# Patient Record
Sex: FEMALE | Race: WHITE | NOT HISPANIC OR LATINO | Employment: OTHER | URBAN - METROPOLITAN AREA
[De-identification: names, ages, dates, MRNs, and addresses within clinical notes are randomized per-mention and may not be internally consistent; named-entity substitution may affect disease eponyms.]

---

## 2023-04-13 LAB
ERYTHROCYTE DISTRIBUTION WIDTH (RATIO) BY AUTOMATED COUNT: 13.6 % (ref 11.5–14.5)
ERYTHROCYTE MEAN CORPUSCULAR HEMOGLOBIN CONCENTRATION (G/DL) BY AUTOMATED: 32.1 G/DL (ref 32–36)
ERYTHROCYTE MEAN CORPUSCULAR VOLUME (FL) BY AUTOMATED COUNT: 91 FL (ref 80–100)
ERYTHROCYTES (10*6/UL) IN BLOOD BY AUTOMATED COUNT: 4.4 X10E12/L (ref 4–5.2)
FOLLITROPIN (IU/L) IN SER/PLAS: 37.7 IU/L
HEMATOCRIT (%) IN BLOOD BY AUTOMATED COUNT: 39.9 % (ref 36–46)
HEMOGLOBIN (G/DL) IN BLOOD: 12.8 G/DL (ref 12–16)
LEUKOCYTES (10*3/UL) IN BLOOD BY AUTOMATED COUNT: 11.8 X10E9/L (ref 4.4–11.3)
LUTEINIZING HORMONE (IU/ML) IN SER/PLAS: 15.8 IU/L
NRBC (PER 100 WBCS) BY AUTOMATED COUNT: 0 /100 WBC (ref 0–0)
PLATELETS (10*3/UL) IN BLOOD AUTOMATED COUNT: 168 X10E9/L (ref 150–450)
THYROTROPIN (MIU/L) IN SER/PLAS BY DETECTION LIMIT <= 0.05 MIU/L: 2.63 MIU/L (ref 0.44–3.98)

## 2023-06-29 ENCOUNTER — HOSPITAL ENCOUNTER (OUTPATIENT)
Dept: DATA CONVERSION | Facility: HOSPITAL | Age: 48
End: 2023-06-29
Attending: OBSTETRICS & GYNECOLOGY | Admitting: OBSTETRICS & GYNECOLOGY
Payer: COMMERCIAL

## 2023-06-29 DIAGNOSIS — N84.0 POLYP OF CORPUS UTERI: ICD-10-CM

## 2023-06-29 DIAGNOSIS — N93.9 ABNORMAL UTERINE AND VAGINAL BLEEDING, UNSPECIFIED: ICD-10-CM

## 2023-06-29 DIAGNOSIS — D25.9 LEIOMYOMA OF UTERUS, UNSPECIFIED: ICD-10-CM

## 2023-06-29 LAB — HCG, URINE: NEGATIVE

## 2023-07-07 LAB
COMPLETE PATHOLOGY REPORT: NORMAL
CONVERTED CLINICAL DIAGNOSIS-HISTORY: NORMAL
CONVERTED FINAL DIAGNOSIS: NORMAL
CONVERTED FINAL REPORT PDF LINK TO COPY AND PASTE: NORMAL
CONVERTED GROSS DESCRIPTION: NORMAL

## 2023-09-30 NOTE — H&P
History of Present Illness:   Pregnant/Lactating:  ·  Are You Pregnant no   ·  Are You Currently Breastfeeding no     History Present Illness:  Reason for surgery: Heavy menstrual bleeding, fibroid  uterus   HPI:    47-year-old.  Abnormal heavy bleeding.  Negative endometrial biopsy.  Ultrasound shows small fibroid.  Plan is for endometrial ablation.    Allergies:        Allergies:  ·  No Known Allergies :     Home Medication Review:   Home Medications Reviewed: yes     Impression/Procedure:   ·  Impression and Planned Procedure: Fibroid uterus, heavy menstrual bleeding, for outpatient endometrial ablation       ERAS (Enhanced Recovery After Surgery):  ·  ERAS Patient: no       Physical Exam by System:    Constitutional: Well developed, awake/alert/oriented  x3, no distress, alert and cooperative   Eyes: PERRL, EOMI, clear sclera   ENMT: mucous membranes moist, no apparent injury,  no lesions seen   Respiratory/Thorax: Patent airways, CTAB, normal  breath sounds with good chest expansion, thorax symmetric   Cardiovascular: Regular, rate and rhythm, no murmurs,  2+ equal pulses of the extremities, normal S 1and S 2   Gastrointestinal: Nondistended, soft, non-tender,  no rebound tenderness or guarding, no masses palpable, no organomegaly, +BS, no bruits   Genitourinary: spec exam, clots removed, cervix open  with tissue at OS   Musculoskeletal: ROM intact, no joint swelling, normal  strength   Extremities: normal extremities, no cyanosis edema,  contusions or wounds, no clubbing   Neurological: alert and oriented x3, intact senses,  motor, response and reflexes, normal strength   Breast: No masses, tenderness, no discharge or discoloration   Lymphatic: No significant lymphadenopathy   Psychological: Appropriate mood and behavior   Skin: Warm and dry, no lesions, no rashes     Consent:   COVID-19 Consent:  ·  COVID-19 Risk Consent Surgeon has reviewed key risks related to the risk of josé miguel COVID-19 and if  they contract COVID-19 what the risks are.       Electronic Signatures:  Franklyn Gould)  (Signed 28-Jun-2023 15:57)   Authored: History of Present Illness, Allergies, Home  Medication Review, Impression/Procedure, ERAS, Physical Exam, Consent, Note Completion      Last Updated: 28-Jun-2023 15:57 by Franklyn Gould)

## 2023-10-02 NOTE — OP NOTE
Post Operative Note:     Post-Procedure Diagnosis: Abnormal uterine bleeding,  uterine polyp   Procedure: 1.  D&C, hysteroscopic endometrial ablation  2.   3.   4.   5.   Surgeon: Luis Fernando   Resident/Fellow/Other Assistant: No   Estimated Blood Loss (mL): Minimal   Specimen: yes. Endometrial curettings with polyp  just   Findings: Endometrial polyp     Operative Report Dictated:  Dictation: not applicable - note contains Operative  Report   Note Recipients: Franklyn Gould MD Koniarczyk, Michael P, MD - [id number] []   Operative Report:    June 29, 2023 [facility]    Preop diagnosis abnormal uterine bleeding, postop the same plus endometrial polyp    Received with D&C, hysteroscopic endometrial ablation    Surgeon myself, general anesthetic    Under general anesthetic lithotomy position patient prepped draped usual manner.  Speculum placed.  Single-tooth tenaculum used to grasp the antilipid cervix.  Internal os dilated.  Hysteroscope inserted and cavity visualized.  Polyp emanating from the  fundus.  Removed with ovum forceps.  Using a rollerball electrode and glycine as a distending medium, after sharp and suction curettage of the cavity, cavity ablated in its entirety from fundus to internal os.  Fluid differential minimal.  Tolerated procedure  well.  Taken recovery in stable condition    Attestation:   Note Completion:  Attending Attestation I performed the procedure without a resident         Electronic Signatures:  Franklyn Gould)  (Signed 29-Jun-2023 16:09)   Authored: Post Operative Note, Note Completion      Last Updated: 29-Jun-2023 16:09 by Franklyn Gould)

## 2023-12-19 ENCOUNTER — OFFICE VISIT (OUTPATIENT)
Dept: OBSTETRICS AND GYNECOLOGY | Facility: CLINIC | Age: 48
End: 2023-12-19
Payer: COMMERCIAL

## 2023-12-19 VITALS
DIASTOLIC BLOOD PRESSURE: 78 MMHG | BODY MASS INDEX: 26.84 KG/M2 | HEIGHT: 66 IN | WEIGHT: 167 LBS | SYSTOLIC BLOOD PRESSURE: 118 MMHG

## 2023-12-19 DIAGNOSIS — L73.2 HIDRADENITIS SUPPURATIVA: Primary | ICD-10-CM

## 2023-12-19 PROCEDURE — 99214 OFFICE O/P EST MOD 30 MIN: CPT | Performed by: OBSTETRICS & GYNECOLOGY

## 2023-12-19 RX ORDER — CEPHALEXIN 500 MG/1
500 CAPSULE ORAL EVERY 8 HOURS SCHEDULED
Status: SHIPPED | OUTPATIENT
Start: 2023-12-19 | End: 2023-12-26

## 2023-12-19 RX ORDER — LEVOTHYROXINE SODIUM 75 UG/1
75 TABLET ORAL
COMMUNITY

## 2023-12-19 RX ORDER — LORAZEPAM 0.5 MG/1
0.5 TABLET ORAL EVERY 6 HOURS PRN
COMMUNITY

## 2023-12-19 NOTE — PROGRESS NOTES
Subjective   Patient ID: Sammi Miranda is a 48 y.o. female who presents for vulvar lesion.  Established patient.  She is doing great since her endometrial ablation earlier this year but she has an issue with a recurring infection in the left inner groin.  Occasionally it increases in size becomes painful drains and she states it has been ongoing for years.    On exam there is a firmness in the left inguinal area superficial.  Consistent with hidradenitis suppurativa.  We reviewed the nature of this condition chronic in nature recurring nature different treatment options.  She is at a point where she would like to consider excision.    This point I do need to treated as an infection so we will prescribe Keflex 500 mg 3 times a day for 7 days.  Follow-up with me in january.  If there is still a palpable lesion we will consider excision        Review of Systems   All other systems reviewed and are negative.      Objective   Physical Exam  Genitourinary:         Comments: Palpable firm tender area left inguinal region        Assessment/Plan   Chronic recurrent infection left inguinal area.  Hidradenitis suppurativa.  Inflamed today.  Call in Keflex 500 3 times daily 7 days.  Follow-up next month to consider excision         Franklyn Gould MD 12/19/23 2:41 PM

## 2023-12-22 ENCOUNTER — PATIENT OUTREACH (OUTPATIENT)
Dept: OBSTETRICS AND GYNECOLOGY | Facility: HOSPITAL | Age: 48
End: 2023-12-22
Payer: COMMERCIAL

## 2023-12-22 ENCOUNTER — TELEPHONE (OUTPATIENT)
Dept: OBSTETRICS AND GYNECOLOGY | Facility: CLINIC | Age: 48
End: 2023-12-22
Payer: COMMERCIAL

## 2023-12-22 DIAGNOSIS — L03.818 CELLULITIS OF OTHER SPECIFIED SITE: ICD-10-CM

## 2023-12-22 DIAGNOSIS — N76.2 ACUTE VULVITIS: Primary | ICD-10-CM

## 2023-12-22 RX ORDER — CEPHALEXIN 500 MG/1
500 CAPSULE ORAL EVERY 8 HOURS SCHEDULED
Status: SHIPPED | OUTPATIENT
Start: 2023-12-22 | End: 2023-12-29

## 2024-01-23 ENCOUNTER — OFFICE VISIT (OUTPATIENT)
Dept: OBSTETRICS AND GYNECOLOGY | Facility: CLINIC | Age: 49
End: 2024-01-23
Payer: COMMERCIAL

## 2024-01-23 ENCOUNTER — PREP FOR PROCEDURE (OUTPATIENT)
Dept: OBSTETRICS AND GYNECOLOGY | Facility: HOSPITAL | Age: 49
End: 2024-01-23

## 2024-01-23 VITALS
SYSTOLIC BLOOD PRESSURE: 112 MMHG | BODY MASS INDEX: 26.84 KG/M2 | WEIGHT: 167 LBS | DIASTOLIC BLOOD PRESSURE: 72 MMHG | HEIGHT: 66 IN

## 2024-01-23 DIAGNOSIS — L73.2 HIDRADENITIS SUPPURATIVA: ICD-10-CM

## 2024-01-23 DIAGNOSIS — N90.89 VULVAR LESION: Primary | ICD-10-CM

## 2024-01-23 DIAGNOSIS — L73.2 VULVAL HIDRADENITIS SUPPURATIVA: Primary | ICD-10-CM

## 2024-01-23 PROCEDURE — 99214 OFFICE O/P EST MOD 30 MIN: CPT | Performed by: OBSTETRICS & GYNECOLOGY

## 2024-01-23 RX ORDER — CELECOXIB 50 MG/1
400 CAPSULE ORAL ONCE
Status: CANCELLED | OUTPATIENT
Start: 2024-01-23 | End: 2024-01-23

## 2024-01-23 RX ORDER — GABAPENTIN 600 MG/1
600 TABLET ORAL ONCE
Status: CANCELLED | OUTPATIENT
Start: 2024-01-23 | End: 2024-01-23

## 2024-01-23 RX ORDER — ACETAMINOPHEN 325 MG/1
975 TABLET ORAL ONCE
Status: CANCELLED | OUTPATIENT
Start: 2024-01-23 | End: 2024-01-23

## 2024-01-23 NOTE — PROGRESS NOTES
Subjective   Patient ID: Sammi Miranda is a 48 y.o. female who presents for Follow up medication.  My last note:   Franklyn Gould MD  Physician  Obstetrics     Progress Notes      Signed     Encounter Date: 12/19/2023    Signed     Expand All Collapse All       Subjective   Patient ID: Sammi Miranda is a 48 y.o. female who presents for vulvar lesion.  Established patient.  She is doing great since her endometrial ablation earlier this year but she has an issue with a recurring infection in the left inner groin.  Occasionally it increases in size becomes painful drains and she states it has been ongoing for years.     On exam there is a firmness in the left inguinal area superficial.  Consistent with hidradenitis suppurativa.  We reviewed the nature of this condition chronic in nature recurring nature different treatment options.  She is at a point where she would like to consider excision.     This point I do need to treated as an infection so we will prescribe Keflex 500 mg 3 times a day for 7 days.  Follow-up with me in january.  If there is still a palpable lesion we will consider excision           Review of Systems   All other systems reviewed and are negative.              Objective   Physical Exam  Genitourinary:          Comments: Palpable firm tender area left inguinal region                 Assessment/Plan   Chronic recurrent infection left inguinal area.  Hidradenitis suppurativa.  Inflamed today.  Call in Keflex 500 3 times daily 7 days.  Follow-up next month to consider excision        Franklyn Goudl MD 12/19/23 2:41 PM       Has completed her Keflex.  Comes in today to review options she would really like this area excised.  She is been dealing with it for years.  We reviewed the nature of hidradenitis suppurativa that it is a recurrent infection of the glans.  There is no known treatment that is better than others.  Wide local excision is an option.  Its mainly in her left inguinal area.  Would recommend  marking this preoperatively.  We discussed that it is a surgery surgery has risks of infection bleeding injury to adjacent organs and there is no guarantee that I will prevent a recurrence    Impression is recurrent left inguinal area of infection consistent with hidradenitis suppurativa.  For wide local excision    No medical allergies.  Outpatient surgery.  6 4 to 6 weeks of sutures before they resolve          Review of Systems    Objective   Physical Exam  Constitutional:       Appearance: Normal appearance.   Genitourinary:         Comments: Wali area preoperatively  Neurological:      Mental Status: She is alert.         Assessment/Plan   Provide local excision of vulvar area of hidradenitis suppurativa.  Marked the area preoperatively     Exam today shows that it has healed up nicely after Keflex.  Much less inflamed than when we saw her last month    Franklyn Gould MD 01/23/24 11:54 AM

## 2024-03-19 ENCOUNTER — TELEPHONE (OUTPATIENT)
Dept: PREADMISSION TESTING | Facility: HOSPITAL | Age: 49
End: 2024-03-19
Payer: COMMERCIAL

## 2024-03-19 NOTE — TELEPHONE ENCOUNTER
Pre-procedure PAT phone assessment completed. Pre-operative and medication instructions reviewed with patient. Patient verbalizes understanding of instructions.  SURGERY PRE-OPERATIVE INSTRUCTIONS    *You will receive a phone call the day before your procedure  after 2pm, (or the Friday before your surgery if scheduled on a Monday.) Generally the hospital will be calling you with this information after that time.    *You are not to eat after midnight the night before the surgery. You may have 8oz of a clear liquid up until 2 hours prior to arriving to the hospital. The exception is with medications you were instructed to take day of surgery.    *You may take tylenol for pain/discomfort as needed.     *Stop taking all aspirin products, ibuprofen (motrin/advil), naproxen (aleve/naprosyn) for one week prior to surgery.    *Stop taking all vitamins and supplements one week prior to surgery.     *You should not have alcoholic beverages for 24 hours before surgery.     *You should not smoke 24 hours prior to surgery.     *To help prevent surgical infections bathe/shower with Dial soap the evening before surgery.    *You can wear deodorant but no lotion, powder, or perfume/cologne. You should remove all make-up and nail polish at home.    *If you wear glasses, please bring a case for the glasses with you.    *You will be asked to remove dentures and contacts.     *Please leave all valuables at home.    *You should wear loose, comfortable clothing that will accommodate bandages and/or casts.    *You should notify your doctor of any change in your condition (fever, cold, rash, etc). Surgery may need to be re-scheduled until a time you are in better health.    *A responsible adult is required to accompany you to and from the hospital if you are receiving anesthesia or a sedative. Patients are not permitted to drive for 24 hours after anesthesia.     *You can use the [employer] parking if you wish.     *If you have any further  questions please call [facility] [phone].

## 2024-03-20 ENCOUNTER — ANESTHESIA EVENT (OUTPATIENT)
Dept: OPERATING ROOM | Facility: HOSPITAL | Age: 49
End: 2024-03-20
Payer: COMMERCIAL

## 2024-03-20 RX ORDER — DROPERIDOL 2.5 MG/ML
0.62 INJECTION, SOLUTION INTRAMUSCULAR; INTRAVENOUS ONCE AS NEEDED
Status: CANCELLED | OUTPATIENT
Start: 2024-03-20

## 2024-03-20 RX ORDER — SODIUM CHLORIDE, SODIUM LACTATE, POTASSIUM CHLORIDE, CALCIUM CHLORIDE 600; 310; 30; 20 MG/100ML; MG/100ML; MG/100ML; MG/100ML
100 INJECTION, SOLUTION INTRAVENOUS CONTINUOUS
Status: CANCELLED | OUTPATIENT
Start: 2024-03-20

## 2024-03-20 RX ORDER — OXYCODONE HYDROCHLORIDE 5 MG/1
5 TABLET ORAL EVERY 4 HOURS PRN
Status: CANCELLED | OUTPATIENT
Start: 2024-03-20

## 2024-03-20 RX ORDER — ONDANSETRON HYDROCHLORIDE 2 MG/ML
4 INJECTION, SOLUTION INTRAVENOUS ONCE AS NEEDED
Status: CANCELLED | OUTPATIENT
Start: 2024-03-20

## 2024-03-21 ENCOUNTER — ANESTHESIA (OUTPATIENT)
Dept: OPERATING ROOM | Facility: HOSPITAL | Age: 49
End: 2024-03-21
Payer: COMMERCIAL

## 2024-03-21 ENCOUNTER — HOSPITAL ENCOUNTER (OUTPATIENT)
Facility: HOSPITAL | Age: 49
Setting detail: OUTPATIENT SURGERY
Discharge: HOME | End: 2024-03-21
Attending: OBSTETRICS & GYNECOLOGY | Admitting: OBSTETRICS & GYNECOLOGY
Payer: COMMERCIAL

## 2024-03-21 VITALS
RESPIRATION RATE: 18 BRPM | HEIGHT: 66 IN | BODY MASS INDEX: 26.57 KG/M2 | TEMPERATURE: 97.5 F | HEART RATE: 72 BPM | DIASTOLIC BLOOD PRESSURE: 97 MMHG | OXYGEN SATURATION: 100 % | SYSTOLIC BLOOD PRESSURE: 129 MMHG | WEIGHT: 165.34 LBS

## 2024-03-21 PROBLEM — E78.2 MIXED HYPERLIPIDEMIA: Status: ACTIVE | Noted: 2024-03-21

## 2024-03-21 PROBLEM — F41.9 ANXIETY DISORDER: Status: ACTIVE | Noted: 2018-10-01

## 2024-03-21 PROBLEM — I45.10 INCOMPLETE RIGHT BUNDLE BRANCH BLOCK: Status: ACTIVE | Noted: 2024-03-21

## 2024-03-21 PROBLEM — E03.8 HYPOTHYROIDISM DUE TO HASHIMOTO'S THYROIDITIS: Status: ACTIVE | Noted: 2018-03-19

## 2024-03-21 PROBLEM — E06.3 HYPOTHYROIDISM DUE TO HASHIMOTO'S THYROIDITIS: Status: ACTIVE | Noted: 2018-03-19

## 2024-03-21 PROBLEM — E03.9 HYPOTHYROIDISM: Status: ACTIVE | Noted: 2024-03-21

## 2024-03-21 LAB — PREGNANCY TEST URINE, POC: NEGATIVE

## 2024-03-21 PROCEDURE — 81025 URINE PREGNANCY TEST: CPT | Performed by: ANESTHESIOLOGY

## 2024-03-21 RX ORDER — GABAPENTIN 300 MG/1
600 CAPSULE ORAL ONCE
Status: DISCONTINUED | OUTPATIENT
Start: 2024-03-21 | End: 2024-03-22 | Stop reason: HOSPADM

## 2024-03-21 RX ORDER — CELECOXIB 400 MG/1
400 CAPSULE ORAL ONCE
Status: DISCONTINUED | OUTPATIENT
Start: 2024-03-21 | End: 2024-03-22 | Stop reason: HOSPADM

## 2024-03-21 RX ORDER — SODIUM CHLORIDE, SODIUM LACTATE, POTASSIUM CHLORIDE, CALCIUM CHLORIDE 600; 310; 30; 20 MG/100ML; MG/100ML; MG/100ML; MG/100ML
100 INJECTION, SOLUTION INTRAVENOUS CONTINUOUS
Status: DISCONTINUED | OUTPATIENT
Start: 2024-03-21 | End: 2024-03-22 | Stop reason: HOSPADM

## 2024-03-21 RX ORDER — ACETAMINOPHEN 325 MG/1
975 TABLET ORAL ONCE
Status: DISCONTINUED | OUTPATIENT
Start: 2024-03-21 | End: 2024-03-22 | Stop reason: HOSPADM

## 2024-03-21 SDOH — HEALTH STABILITY: MENTAL HEALTH: CURRENT SMOKER: 1

## 2024-03-21 NOTE — H&P
History Of Present Illness  sammi Miranda is a 48 y.o. female presenting with    Franklyn Gould MD  Physician  Obstetrics     Progress Notes     Signed     Encounter Date: 1/23/2024     Signed       Expand All Collapse All       Subjective   Patient ID: Sammi Miranda is a 48 y.o. female who presents for Follow up medication.  My last note:   Franklyn Gould MD  Physician  Obstetrics     Progress Notes      Signed     Encounter Date: 12/19/2023     Signed      Expand All Collapse All        Subjective   Patient ID: Sammi Miranda is a 48 y.o. female who presents for vulvar lesion.  Established patient.  She is doing great since her endometrial ablation earlier this year but she has an issue with a recurring infection in the left inner groin.  Occasionally it increases in size becomes painful drains and she states it has been ongoing for years.     On exam there is a firmness in the left inguinal area superficial.  Consistent with hidradenitis suppurativa.  We reviewed the nature of this condition chronic in nature recurring nature different treatment options.  She is at a point where she would like to consider excision.     This point I do need to treated as an infection so we will prescribe Keflex 500 mg 3 times a day for 7 days.  Follow-up with me in january.  If there is still a palpable lesion we will consider excision           Review of Systems   All other systems reviewed and are negative.              Objective   Physical Exam  Genitourinary:           Comments: Palpable firm tender area left inguinal region                 Assessment/Plan   Chronic recurrent infection left inguinal area.  Hidradenitis suppurativa.  Inflamed today.  Call in Keflex 500 3 times daily 7 days.  Follow-up next month to consider excision        Franklyn Gould MD 12/19/23 2:41 PM         Has completed her Keflex.  Comes in today to review options she would really like this area excised.  She is been dealing with it for years.  We reviewed the  nature of hidradenitis suppurativa that it is a recurrent infection of the glans.  There is no known treatment that is better than others.  Wide local excision is an option.  Its mainly in her left inguinal area.  Would recommend marking this preoperatively.  We discussed that it is a surgery surgery has risks of infection bleeding injury to adjacent organs and there is no guarantee that I will prevent a recurrence     Impression is recurrent left inguinal area of infection consistent with hidradenitis suppurativa.  For wide local excision     No medical allergies.  Outpatient surgery.  6 4 to 6 weeks of sutures before they resolve              Review of Systems           Objective   Physical Exam  Constitutional:       Appearance: Normal appearance.   Genitourinary:          Comments: Wali area preoperatively  Neurological:      Mental Status: She is alert.                  Assessment/Plan   Provide local excision of vulvar area of hidradenitis suppurativa.  Marked the area preoperatively      Exam today shows that it has healed up nicely after Keflex.  Much less inflamed than when we saw her last month     Franklyn Gould MD 24 11:54 AM             .     Past Medical History  Past Medical History:   Diagnosis Date    Abnormal uterine bleeding (AUB)     Anxiety     Hidradenitis suppurativa     Hyperlipidemia     Hypothyroidism     Hypothyroidism due to Hashimoto's thyroiditis    Vulvar lesion     Vulval hidradenitis suppurativa       Surgical History  Past Surgical History:   Procedure Laterality Date     SECTION, CLASSIC      DENTAL IMPLANT      HYSTEROSCOPY      D&C, endometrial ablation        Social History  She reports that she has been smoking cigarettes. She has a 7.50 pack-year smoking history. She has never used smokeless tobacco. She reports current alcohol use. She reports current drug use. Drug: Marijuana.    Family History  No family history on file.     Allergies  Patient has no known  allergies.    Review of Systems     Physical Exam     Last Recorded Vitals  There were no vitals taken for this visit.    Relevant Results             Assessment/Plan   Principal Problem:    Vulval hidradenitis suppurativa             I spent 10 minutes in the professional and overall care of this patient.      Franklyn Gould MD

## 2024-03-21 NOTE — ANESTHESIA PREPROCEDURE EVALUATION
Patient: Sammi MARRERO Fee    Procedure Information       Date/Time: 03/21/24 1035    Procedure: EXCISION LESION FEMALE GENITALIA (Left)    Location: [facility] / [facility]    Surgeons: Franklyn Gould MD            Relevant Problems   Anesthesia (within normal limits)   (-) Malignant hyperthermia      Cardiovascular (within normal limits)      Endocrine   (+) Hypothyroidism      GI (within normal limits)      /Renal (within normal limits)      Neuro/Psych (within normal limits)      Pulmonary (within normal limits)      GI/Hepatic (within normal limits)      Hematology (within normal limits)      Musculoskeletal (within normal limits)      Eyes, Ears, Nose, and Throat (within normal limits)      Infectious Disease   (+) Vulval hidradenitis suppurativa       Clinical information reviewed:   Tobacco  Allergies  Meds   Med Hx  Surg Hx   Fam Hx          NPO Detail:  No data recorded     PHYSICAL EXAM    Anesthesia Plan    History of general anesthesia?: yes  History of complications of general anesthesia?: no    ASA 2     general     The patient is a current smoker.    intravenous induction   Postoperative administration of opioids is intended.  Trial extubation is planned.  Anesthetic plan and risks discussed with patient.    Plan discussed with CRNA.

## 2024-06-11 ENCOUNTER — APPOINTMENT (OUTPATIENT)
Dept: OBSTETRICS AND GYNECOLOGY | Facility: CLINIC | Age: 49
End: 2024-06-11
Payer: COMMERCIAL

## 2024-06-25 ENCOUNTER — OFFICE VISIT (OUTPATIENT)
Dept: OBSTETRICS AND GYNECOLOGY | Facility: CLINIC | Age: 49
End: 2024-06-25
Payer: COMMERCIAL

## 2024-06-25 VITALS
BODY MASS INDEX: 26.36 KG/M2 | DIASTOLIC BLOOD PRESSURE: 68 MMHG | HEIGHT: 66 IN | WEIGHT: 164 LBS | SYSTOLIC BLOOD PRESSURE: 112 MMHG

## 2024-06-25 DIAGNOSIS — R10.2 PAIN IN FEMALE PELVIS: Primary | ICD-10-CM

## 2024-06-25 PROCEDURE — 99214 OFFICE O/P EST MOD 30 MIN: CPT | Performed by: OBSTETRICS & GYNECOLOGY

## 2024-06-25 NOTE — PROGRESS NOTES
Subjective   Patient ID: Sammi Miranda is a 48 y.o. female who presents for Pelvic Pain.  Established patient 48 years old.  Endometrial ablation 2023 no menses since then    Surgeries include , tubal ligation, appendectomy    Since mid May she has been having right-sided pain in the area of her ovary.  Initially made her double over when she had the pain has been off and on since and then again recently at work she was doubled over rating the pain a 10 out of 10 and rates it 3 out of 10    No fever chills nausea vomiting diarrhea no other associated symptoms.    Will start with a pelvic ultrasound and then have her follow-up.  Depending on findings we may need to consider laparoscopy or consultation    Pelvic Pain  The patient's primary symptoms include pelvic pain.       Review of Systems   Genitourinary:  Positive for pelvic pain.       Objective   Physical Exam  Constitutional:       Appearance: Normal appearance. She is normal weight.   Neurological:      Mental Status: She is alert.         Assessment/Plan   Right lower quadrant pain off and on since mid May.  Occasionally 10 out of 10.  Start with pelvic ultrasound.  Follow-up to review results and recommendations         Franklyn Gould MD 24 9:48 AM

## 2024-06-28 ENCOUNTER — HOSPITAL ENCOUNTER (OUTPATIENT)
Dept: RADIOLOGY | Facility: CLINIC | Age: 49
Discharge: HOME | End: 2024-06-28
Payer: COMMERCIAL

## 2024-06-28 DIAGNOSIS — R10.2 PAIN IN FEMALE PELVIS: ICD-10-CM

## 2024-06-28 PROCEDURE — 76856 US EXAM PELVIC COMPLETE: CPT

## 2024-07-09 ENCOUNTER — APPOINTMENT (OUTPATIENT)
Dept: OBSTETRICS AND GYNECOLOGY | Facility: CLINIC | Age: 49
End: 2024-07-09
Payer: COMMERCIAL

## 2024-07-09 ENCOUNTER — PREP FOR PROCEDURE (OUTPATIENT)
Dept: OBSTETRICS AND GYNECOLOGY | Facility: HOSPITAL | Age: 49
End: 2024-07-09

## 2024-07-09 VITALS
SYSTOLIC BLOOD PRESSURE: 112 MMHG | DIASTOLIC BLOOD PRESSURE: 68 MMHG | BODY MASS INDEX: 26.36 KG/M2 | HEIGHT: 66 IN | WEIGHT: 164 LBS

## 2024-07-09 DIAGNOSIS — D25.9 UTERINE LEIOMYOMA, UNSPECIFIED LOCATION: ICD-10-CM

## 2024-07-09 DIAGNOSIS — R10.2 PAIN IN FEMALE PELVIS: Primary | ICD-10-CM

## 2024-07-09 PROCEDURE — 99214 OFFICE O/P EST MOD 30 MIN: CPT | Performed by: OBSTETRICS & GYNECOLOGY

## 2024-07-09 RX ORDER — CELECOXIB 50 MG/1
400 CAPSULE ORAL ONCE
OUTPATIENT
Start: 2024-07-09 | End: 2024-07-09

## 2024-07-09 RX ORDER — ACETAMINOPHEN 325 MG/1
975 TABLET ORAL ONCE
OUTPATIENT
Start: 2024-07-09 | End: 2024-07-09

## 2024-07-09 RX ORDER — GABAPENTIN 600 MG/1
600 TABLET ORAL ONCE
OUTPATIENT
Start: 2024-07-09 | End: 2024-07-09

## 2024-07-09 ASSESSMENT — ENCOUNTER SYMPTOMS
NAUSEA: 0
HEADACHES: 0
ARTHRALGIAS: 0
WEIGHT LOSS: 0
HEMATOCHEZIA: 0
FLATUS: 0
ANOREXIA: 0
VOMITING: 0
HEMATURIA: 0
DIARRHEA: 0
DYSURIA: 0
MYALGIAS: 0
ABDOMINAL PAIN: 1
BELCHING: 0
FEVER: 0
FREQUENCY: 0
CONSTIPATION: 0

## 2024-07-09 NOTE — PROGRESS NOTES
Subjective   Patient ID: Sammi Miranda is a 48 y.o. female who presents for Follow-up.  Review of my previous note,     Franklyn Gould MD  Physician  Obstetrics     Progress Notes     Signed     Encounter Date: 2024    Signed     Expand All Collapse All       Subjective   Patient ID: Sammi Miranda is a 48 y.o. female who presents for Pelvic Pain.  Established patient 48 years old.  Endometrial ablation 2023 no menses since then     Surgeries include , tubal ligation, appendectomy     Since mid May she has been having right-sided pain in the area of her ovary.  Initially made her double over when she had the pain has been off and on since and then again recently at work she was doubled over rating the pain a 10 out of 10 and rates it 3 out of 10     No fever chills nausea vomiting diarrhea no other associated symptoms.     Will start with a pelvic ultrasound and then have her follow-up.  Depending on findings we may need to consider laparoscopy or consultation     Pelvic Pain  The patient's primary symptoms include pelvic pain.         Review of Systems   Genitourinary:  Positive for pelvic pain.               Objective   Physical Exam  Constitutional:       Appearance: Normal appearance. She is normal weight.   Neurological:      Mental Status: She is alert.                  Assessment/Plan   Right lower quadrant pain off and on since mid May.  Occasionally 10 out of 10.  Start with pelvic ultrasound.  Follow-up to review results and recommendations        Franklyn Gould MD 24 9:48 AM               Electronically signed by Franklyn Gould MD at 2024  9:50 AM      Review of recent ultrasound, consistent with fibroids,  US PELVIS TRANSABDOMINAL WITH TRANSVAGINAL  Status: Final result    Study Result    Narrative & Impression  Interpreted By:  Antony Bardales,   STUDY:  US PELVIS TRANSABDOMINAL WITH TRANSVAGINAL;  2024 9:39 am      INDICATION:  Signs/Symptoms:Right lower quadrant pain.       COMPARISON:  04/14/2023      ACCESSION NUMBER(S):  [id number]      ORDERING CLINICIAN:  XAVIER PETER      TECHNIQUE:  Multiple multiplanar static gray scale, color and spectral waveform  sonographic images of the pelvis were obtained.      FINDINGS:  UTERUS:  The uterus measures  5.0 x 4.1 cm x 11.4 cm. Multiple fibroids are  noted, the largest in the fundus measures 1.5 x 1.8 x 1.4 cm.      ENDOMETRIUM:  The endometrium measures a thickness of 0.2 cm, which is normal.      RIGHT ADNEXA:  The right ovary measures 2.5 cm x 1.9 cm x 3.7 and demonstrates  normal flow. A benign follicular cyst is seen measuring 1.9 cm in  length.      LEFT ADNEXA:  The left ovary measures 1.5 cm x 1.7 cm x 2.9 cm and demonstrates  normal flow. No gross left adnexal masses are seen, no hydrosalpinx.      CUL DE SAC:  A trace amount of physiologic free fluid is seen within the pelvis.      IMPRESSION:  1. Multiple uterine fibroids, otherwise unremarkable pelvic  ultrasound.      MACRO:  None      Signed by: Antony Bardales 6/29/2024 9:56 AM  Dictation workstation:   [id number]        Abdominal Pain  This is a new problem. The current episode started more than 1 month ago. The onset quality is sudden. The problem occurs intermittently. The pain is located in the right flank. The pain is at a severity of 10/10. The quality of the pain is sharp. The abdominal pain does not radiate. Pertinent negatives include no anorexia, arthralgias, belching, constipation, diarrhea, dysuria, fever, flatus, frequency, headaches, hematochezia, hematuria, melena, myalgias, nausea, vomiting or weight loss. Nothing aggravates the pain. The pain is relieved by Nothing. Prior diagnostic workup includes ultrasound.       Review of Systems   Constitutional:  Negative for fever and weight loss.   Gastrointestinal:  Positive for abdominal pain. Negative for anorexia, constipation, diarrhea, flatus, hematochezia, melena, nausea and vomiting.   Genitourinary:   Negative for dysuria, frequency and hematuria.   Musculoskeletal:  Negative for arthralgias and myalgias.   Neurological:  Negative for headaches.       Objective   Physical Exam    Assessment/Plan   2 months of ongoing pain.  The last few days she is been better.  Ultrasound confirms fibroid.  Reviewed options including observation, trial of medications including GnRH antagonist.  She does smoke.  Or laparoscopy to assess for other possible conditions and see if we can improve her pain.  She wants to proceed with laparoscopy.  Reviewed surgery risk benefits complications recovery.  No medical allergies         Franklyn Gould MD 07/09/24 10:09 AM

## 2024-07-30 ENCOUNTER — PRE-ADMISSION TESTING (OUTPATIENT)
Dept: PREADMISSION TESTING | Facility: HOSPITAL | Age: 49
End: 2024-07-30
Payer: COMMERCIAL

## 2024-07-30 ENCOUNTER — ANESTHESIA EVENT (OUTPATIENT)
Dept: OPERATING ROOM | Facility: HOSPITAL | Age: 49
End: 2024-07-30
Payer: COMMERCIAL

## 2024-07-30 VITALS
SYSTOLIC BLOOD PRESSURE: 119 MMHG | OXYGEN SATURATION: 98 % | HEART RATE: 66 BPM | HEIGHT: 66 IN | WEIGHT: 163.14 LBS | TEMPERATURE: 96.8 F | RESPIRATION RATE: 16 BRPM | DIASTOLIC BLOOD PRESSURE: 69 MMHG | BODY MASS INDEX: 26.22 KG/M2

## 2024-07-30 DIAGNOSIS — R10.2 PAIN IN FEMALE PELVIS: ICD-10-CM

## 2024-07-30 DIAGNOSIS — Z01.818 PRE-OPERATIVE EXAMINATION: Primary | ICD-10-CM

## 2024-07-30 LAB
ANION GAP SERPL CALC-SCNC: 12 MMOL/L (ref 10–20)
BASOPHILS # BLD AUTO: 0.07 X10*3/UL (ref 0–0.1)
BASOPHILS NFR BLD AUTO: 0.7 %
BUN SERPL-MCNC: 11 MG/DL (ref 6–23)
CALCIUM SERPL-MCNC: 9.2 MG/DL (ref 8.6–10.3)
CHLORIDE SERPL-SCNC: 100 MMOL/L (ref 98–107)
CO2 SERPL-SCNC: 27 MMOL/L (ref 21–32)
CREAT SERPL-MCNC: 0.76 MG/DL (ref 0.5–1.05)
EGFRCR SERPLBLD CKD-EPI 2021: >90 ML/MIN/1.73M*2
EOSINOPHIL # BLD AUTO: 0.21 X10*3/UL (ref 0–0.7)
EOSINOPHIL NFR BLD AUTO: 2 %
ERYTHROCYTE [DISTWIDTH] IN BLOOD BY AUTOMATED COUNT: 13.5 % (ref 11.5–14.5)
GLUCOSE SERPL-MCNC: 123 MG/DL (ref 74–99)
HCT VFR BLD AUTO: 42.7 % (ref 36–46)
HGB BLD-MCNC: 13.8 G/DL (ref 12–16)
IMM GRANULOCYTES # BLD AUTO: 0.04 X10*3/UL (ref 0–0.7)
IMM GRANULOCYTES NFR BLD AUTO: 0.4 % (ref 0–0.9)
LYMPHOCYTES # BLD AUTO: 1.9 X10*3/UL (ref 1.2–4.8)
LYMPHOCYTES NFR BLD AUTO: 18.5 %
MCH RBC QN AUTO: 29.3 PG (ref 26–34)
MCHC RBC AUTO-ENTMCNC: 32.3 G/DL (ref 32–36)
MCV RBC AUTO: 91 FL (ref 80–100)
MONOCYTES # BLD AUTO: 0.39 X10*3/UL (ref 0.1–1)
MONOCYTES NFR BLD AUTO: 3.8 %
NEUTROPHILS # BLD AUTO: 7.64 X10*3/UL (ref 1.2–7.7)
NEUTROPHILS NFR BLD AUTO: 74.6 %
NRBC BLD-RTO: 0 /100 WBCS (ref 0–0)
PLATELET # BLD AUTO: 383 X10*3/UL (ref 150–450)
POTASSIUM SERPL-SCNC: 4.3 MMOL/L (ref 3.5–5.3)
RBC # BLD AUTO: 4.71 X10*6/UL (ref 4–5.2)
SODIUM SERPL-SCNC: 135 MMOL/L (ref 136–145)
WBC # BLD AUTO: 10.3 X10*3/UL (ref 4.4–11.3)

## 2024-07-30 PROCEDURE — 36415 COLL VENOUS BLD VENIPUNCTURE: CPT

## 2024-07-30 PROCEDURE — 85025 COMPLETE CBC W/AUTO DIFF WBC: CPT

## 2024-07-30 PROCEDURE — 99244 OFF/OP CNSLTJ NEW/EST MOD 40: CPT | Performed by: REGISTERED NURSE

## 2024-07-30 PROCEDURE — 80048 BASIC METABOLIC PNL TOTAL CA: CPT

## 2024-07-30 ASSESSMENT — DUKE ACTIVITY SCORE INDEX (DASI)
CAN YOU DO HEAVY WORK AROUND THE HOUSE LIKE SCRUBBING FLOORS OR LIFTING AND MOVING HEAVY FURNITURE: YES
CAN YOU PARTICIPATE IN MODERATE RECREATIONAL ACTIVITIES LIKE GOLF, BOWLING, DANCING, DOUBLES TENNIS OR THROWING A BASEBALL OR FOOTBALL: YES
CAN YOU WALK A BLOCK OR TWO ON LEVEL GROUND: YES
CAN YOU HAVE SEXUAL RELATIONS: YES
CAN YOU PARTICIPATE IN STRENOUS SPORTS LIKE SWIMMING, SINGLES TENNIS, FOOTBALL, BASKETBALL, OR SKIING: YES
CAN YOU DO LIGHT WORK AROUND THE HOUSE LIKE DUSTING OR WASHING DISHES: YES
TOTAL_SCORE: 58.2
CAN YOU RUN A SHORT DISTANCE: YES
CAN YOU DO MODERATE WORK AROUND THE HOUSE LIKE VACUUMING, SWEEPING FLOORS OR CARRYING GROCERIES: YES
CAN YOU DO YARD WORK LIKE RAKING LEAVES, WEEDING OR PUSHING A MOWER: YES
CAN YOU CLIMB A FLIGHT OF STAIRS OR WALK UP A HILL: YES
CAN YOU WALK INDOORS, SUCH AS AROUND YOUR HOUSE: YES
CAN YOU TAKE CARE OF YOURSELF (EAT, DRESS, BATHE, OR USE TOILET): YES
DASI METS SCORE: 9.9

## 2024-07-30 ASSESSMENT — LIFESTYLE VARIABLES: SMOKING_STATUS: SMOKER

## 2024-07-30 ASSESSMENT — ENCOUNTER SYMPTOMS
MUSCULOSKELETAL NEGATIVE: 1
EYES NEGATIVE: 1
GASTROINTESTINAL NEGATIVE: 1
RESPIRATORY NEGATIVE: 1
NECK NEGATIVE: 1
CONSTITUTIONAL NEGATIVE: 1
CARDIOVASCULAR NEGATIVE: 1
NEUROLOGICAL NEGATIVE: 1

## 2024-07-30 ASSESSMENT — PAIN - FUNCTIONAL ASSESSMENT: PAIN_FUNCTIONAL_ASSESSMENT: 0-10

## 2024-07-30 ASSESSMENT — PAIN SCALES - GENERAL: PAINLEVEL_OUTOF10: 0 - NO PAIN

## 2024-07-30 ASSESSMENT — ACTIVITIES OF DAILY LIVING (ADL): ADL_SCORE: 0

## 2024-07-30 NOTE — PREPROCEDURE INSTRUCTIONS
Medication List            Accurate as of July 30, 2024  9:13 AM. Always use your most recent med list.                levothyroxine 75 mcg tablet  Commonly known as: Synthroid, Levoxyl  Medication Adjustments for Surgery: Take morning of surgery with sip of water, no other fluids     LORazepam 0.5 mg tablet  Commonly known as: Ativan  Medication Adjustments for Surgery: Take morning of surgery with sip of water, no other fluids                                  Thank you for visiting Preadmission Testing (PAT) today for your pre-procedure evaluation, you were seen by     Sue Del Castillo CNP  Pre Admission Testing  [facility]  [phone]    This summary includes instructions and information to aid you during your perioperative period.  Please read carefully. If you have any questions about your visit today, please call the number listed above.  If you become ill or have any changes to your health before your surgery, please contact your primary care provider and alert your surgeon.    Preparing for your Surgery       Exercises  Preoperative Deep Breathing Exercises  Why it is important to do deep breathing exercises before my surgery?  Deep breathing exercises strengthen your breathing muscles.  This helps you to recover after your surgery and decreases the chance of breathing complications.  How are the deep breathing exercises done?  Sit straight with your back supported.  Breathe in deeply and slowly through your nose. Your lower rib cage should expand and your abdomen may move forward.  Hold that breath for 3 to 5 seconds.  Breathe out through pursed lips, slowly and completely.  Rest and repeat 10 times every hour while awake.  Rest longer if you become dizzy or lightheaded.       Preoperative Brain Exercises    What are brain exercises?  A brain exercise is any activity that engages your thinking (cognitive) skills.    What types of activities are considered brain exercises?  Micah  puzzles, crossword puzzles, word jumble, memory games, word search, and many more.  Many can be found free online or on your phone via a mobile autumn.    Why should I do brain exercises before my surgery?  More recent research has shown brain exercise before surgery can lower the risk of postoperative delirium (confusion) which can be especially important for older adults.  Patients who did brain exercises for 5 to 10 hours the days before surgery, cut their risk of postoperative delirium in half up to 1 week after surgery.    Sit-to-Stand Exercise    What is the sit-to-stand exercise?  The sit-to-stand exercise strengthens the muscles of your lower body and muscles in the center of your body (core muscles for stability) helping to maintain and improve your strength and mobility.  How do I do the sit-to-stand exercise?  The goal is to do this exercise without using your arms or hands.  If this is too difficult, use your arms and hands or a chair with armrests to help slowly push yourself to the standing position and lower yourself back to the sitting position. As the movement becomes easier use your arms and hands less.    Steps to the sit-to-stand exercise  Sit up tall in a sturdy chair, knees bent, feet flat on the floor shoulder-width apart.  Shift your hips/pelvis forward in the chair to correctly position yourself for the next movement.  Lean forward at your hips.  Stand up straight putting equal weight on both feet.  Check to be sure you are properly aligned with the chair, in a slow controlled movement sit back down.  Repeat this exercise 10-15 times.  If needed you can do it fewer times until your strength improves.  Rest for 1 minute.  Do another 10-15 sit-to-stand exercises.  Try to do this in the morning and evening.        Instructions    Preoperative Fasting Guidelines    Why must I stop eating and drinking near surgery time?  With sedation, food or liquid in your stomach can enter your lungs causing  serious complications  Food can increase nausea and vomiting  When do I need to stop eating and drinking before my surgery?      Do not eat any food or drink any liquids after midnight the night before your surgery/procedure.  You may have sips of water to take medications.            Simple things you can do to help prevent blood clots     Blood clots are blockages that can form in the body's veins. When a blood clot forms in your deep veins, it may be called a deep vein thrombosis, or DVT for short. Blood clots can happen in any part of the body where blood flows, but they are most common in the arms and legs. If a piece of a blood clot breaks free and travels to the lungs, it is called a pulmonary embolus (PE). A PE can be a very serious problem.         Being in the hospital or having surgery can raise your chances of getting a blood clot because you may not be well enough to move around as much as you normally do.         Ways you can help prevent blood clots in the hospital       Wearing SCDs  SCDs stands for Sequential Compression Devices.   SCDs are special sleeves that wrap around your legs. They attach to a pump that fills them with air to gently squeeze your legs every few minutes.  This helps return the blood in your legs to your heart.   SCDs should only be taken off when walking or bathing. SCDs may not be comfortable, but they can help save your life.              Pump SCD leg sleeves  Wearing compression stockings - if your doctor orders them. These special snug-fitting stockings gently squeeze your legs to help blood flow.       Walking. Walking helps move the blood in your legs.   If your doctor says it is ok, try walking the halls at least   5 times a day. Ask us to help you get up, so you don't fall.      Taking any blood-thinning medicines your doctor orders.              Ways you can help prevent blood clots at home         Wearing compression stockings - if your doctor orders them.   Walking -  to help move the blood in your legs.    Taking any blood-thinning medicines your doctor orders.      Signs of a blood clot or PE    Tell your doctor or nurse right away if you have any of the problems listed below.         If you are at home, seek medical care right away. Call 911 for chest pain or problems breathing.            Signs of a blood clot (DVT) - such as pain, swelling, redness, or warmth in your arm or legs.  Signs of a pulmonary embolism (PE) - such as chest pain or feeling short of breath      Tobacco and Alcohol;  Do not drink alcohol or smoke within 24 hours of surgery.  It is best to quit smoking for as long as possible before any surgery or procedure.    Quitting Smoking; Recognizing Dangerous Situations:  1-Alcohol use during the first month after quitting, 2-Being around smoke or someone who smokes 3-Times situation routinely smoked  4-Triggers-car, breaks, coffee, when awakening, social events  Coping Skills: 1-Learning new ways to manage stress 2-Exercising 3-Relaxation breathing   4-Change routines 5-Distraction techniques    Websites:  Smoke-Free - offers free text messages and an autumn to help you quit. Info includes eating and mood issues that may come with quitting. There is a Live Helpline to talk to an expert. Go to smokefree.gov; Become an Ex-Smoker - the free EX Plan is based on scientific research and useful advice from ex-smokers. It isn't just about quitting smoking.  It's about re-learning life without cigarettes using a 3-step program.  Go to becomeanex.org   Centers for Disease Control offer many suggestions for helping you quit. Includes a Quit Guide and real-life stories. There are sections for specific groups such as LGBT, , different ethnic groups, and pregnant women.  Go to cdc.gov/tobacco/campaign/tips    Other Resources:  [place] Tobacco Quit Line - call 1-800-QUIT-NOW or 1-107-250-3518.  [facility] 2-1-1 - to find local programs and resources. Call 211 or go to  211.org.  [facility] Tobacco Cessation Program - call 740-942-0919.  American Lung Association offers classes for quitting smoking. Some places may charge a fee. For a list of classes, go to lung.org or call 9-376-LUNG-USA.     Some things to think about:; The health benefits of quitting smoking can help most of the major parts of your body. There is no safe amount of cigarette smoke. Quitting smoking can add years to your life. When you quit, you'll also protect your loved ones from dangerous secondhand smoke. Make a plan, join a support group, and talk to your physician to assist in quitting smoking.                                                                                                                      The Week before Surgery        Seven days before Surgery  Check your PAT medication instructions  Do the exercises provided to you by PAT  Arrange for a responsible, adult licensed  to take you home after surgery and stay with you for 24 hours.  You will not be permitted to drive yourself home if you have received any anesthetic/sedation  Six days before surgery  Check your PAT medication instructions  Do the exercises provided to you by PAT  Start using Chlorhexidene (CHG) body wash if prescribed  Five days before surgery  Check your PAT medication instructions  Do the exercises provided to you by PAT  Continue to use CHG body wash if prescribed  Three days before surgery  Check your PAT medication instructions  Do the exercises provided to you by PAT  Continue to use CHG body wash if prescribed  Two days before surgery  Check your PAT medication instructions  Do the exercises provided to you by PAT  Continue to use CHG body wash if prescribed    The Day before Surgery       Check your PAT medication and all other PAT instructions including when to stop eating and drinking  You will be called with your arrival time for surgery in the late afternoon.  If you do not receive a call please  reach out to your surgeon's office.  Do not smoke or drink 24 hours before surgery  Prepare items to bring with you to the hospital  Shower with your chlorhexidine wash if prescribed  Brush your teeth and use your chlorhexidine dental rinse if prescribed    The Day of Surgery       Check your PAT medication instructions  Ensure you follow the instructions for when to stop eating and drinking  Shower, if prescribed use CHG.  Do not apply any lotions, creams, moisturizers, perfume or deodorant  Brush your teeth and use your CHG dental rinse if prescribed  Wear loose comfortable clothing  Avoid make-up  Remove  jewelry and piercings, consider professional piercing removal with a plastic spacer if needed  Bring photo ID and Insurance card  Bring an accurate medication list that includes medication dose, frequency and allergies  Bring a copy of your advanced directives (will, health care power of )  Bring any devices and controllers as well as medical devices you have been provided with for surgery (CPAP, slings, braces, etc.)  Dentures, eyeglasses, and contacts will be removed before surgery, please bring cases for contacts or glasses

## 2024-07-30 NOTE — CPM/PAT H&P
[facility]/PAT Evaluation       Name: Sammi Miranda (Sammi Miranda)  /Age: 1975/48 y.o.     In-Person       Chief Complaint: Evaluation prior to surgery    HPI    Past Medical History:   Diagnosis Date    Abnormal uterine bleeding (AUB)     Anxiety     Hidradenitis suppurativa     Hyperlipidemia     Hypothyroidism     Hypothyroidism due to Hashimoto's thyroiditis    Pelvic pain     Vulvar lesion     Vulval hidradenitis suppurativa       Past Surgical History:   Procedure Laterality Date    APPENDECTOMY       SECTION, CLASSIC      DENTAL IMPLANT      HYSTEROSCOPY      D&C, endometrial ablation       Patient  has no history on file for sexual activity.    No family history on file.    No Known Allergies    Prior to Admission medications    Medication Sig Start Date End Date Taking? Authorizing Provider   levothyroxine (Synthroid, Levoxyl) 75 mcg tablet Take 1 tablet (75 mcg) by mouth once daily in the morning. Take before meals.    Historical Provider, MD   LORazepam (Ativan) 0.5 mg tablet Take 1 tablet (0.5 mg) by mouth every 6 hours if needed for anxiety.    Historical Provider, MD BRIONES ROS:   Constitutional:   neg    Neuro/Psych:   neg    Eyes:   neg    Ears:   neg    Nose:   Mouth:   neg    Throat:   neg    Neck:   neg    Cardio:   neg    Respiratory:   neg    Endocrine:   GI:   neg    :    Occasional pelvic discomfort right side  Musculoskeletal:   neg    Hematologic:   neg    Skin:  neg        Physical Exam  Vitals reviewed.   Constitutional:       Appearance: Normal appearance.   HENT:      Head: Normocephalic and atraumatic.      Mouth/Throat:      Mouth: Mucous membranes are moist.      Pharynx: Oropharynx is clear.   Neck:      Vascular: No carotid bruit.   Cardiovascular:      Rate and Rhythm: Normal rate and regular rhythm.      Pulses: Normal pulses.      Heart sounds: Normal heart sounds.   Pulmonary:      Effort: Pulmonary effort is normal.      Breath sounds: Normal breath sounds.    Musculoskeletal:         General: Normal range of motion.      Cervical back: Normal range of motion and neck supple.   Skin:     General: Skin is warm and dry.      Capillary Refill: Capillary refill takes less than 2 seconds.   Neurological:      General: No focal deficit present.      Mental Status: She is alert and oriented to person, place, and time.   Psychiatric:         Mood and Affect: Mood normal.         Behavior: Behavior normal.         Thought Content: Thought content normal.         Judgment: Judgment normal.          PAT AIRWAY:   Airway:     Mallampati::  IV    TM distance::  >3 FB    Neck ROM::  Full  normal        Visit Vitals  /69   Pulse 66   Temp 36 °C (96.8 °F) (Tympanic)   Resp 16       DASI Risk Score      Flowsheet Row Most Recent Value   DASI SCORE 58.2   METS Score (Will be calculated only when all the questions are answered) 9.9          Caprini DVT Assessment      Flowsheet Row Most Recent Value   DVT Score 5   Current Status Major surgery planned, including arthroscopic and laproscopic (1-2 hours)   History Prior major surgery   Age 40-59 years   BMI 30 or less          Modified Frailty Index    No data to display       CHADS2 Stroke Risk  Current as of 5 minutes ago        N/A 3 to 100%: High Risk   2 to < 3%: Medium Risk   0 to < 2%: Low Risk     Last Change: N/A          This score determines the patient's risk of having a stroke if the patient has atrial fibrillation.        This score is not applicable to this patient. Components are not calculated.          Revised Cardiac Risk Index      Flowsheet Row Most Recent Value   Revised Cardiac Risk Calculator 1          Apfel Simplified Score      Flowsheet Row Most Recent Value   Apfel Simplified Score Calculator 2          Risk Analysis Index Results This Encounter         7/30/2024  0905             MEI Cancer History: Patient does not indicate history of cancer    Total Risk Analysis Index Score Without Cancer: 14    Total  Risk Analysis Index Score: 14          Stop Bang Score      Flowsheet Row Most Recent Value   Do you snore loudly? 0   Do you often feel tired or fatigued after your sleep? 0   Has anyone ever observed you stop breathing in your sleep? 0   Do you have or are you being treated for high blood pressure? 0   Recent BMI (Calculated) 26.3   Is BMI greater than 35 kg/m2? 0=No   Age older than 50 years old? 0=No   Is your neck circumference greater than 17 inches (Male) or 16 inches (Female)? 0   Gender - Male 0=No   STOP-BANG Total Score 0                Assessment & Plan:    48 y.o.  female  scheduled for Exploratory Laparoscopy on 24 with Dr. Gould for  Pelvic Pain.  PMHX includes hypothyroidism, HLD, anxiety, vulval hidradenitis suppurativa, , tubal ligation, appendectomy.  PAT consulted for perioperative risk stratification and optimization.    Neuro:  Hx anxiety on ativan    HEENT:  No HEENT diagnosis or significant findings on chart review or clinical presentation and evaluation. No further preoperative testing/intervention indicated at this time.    Cardiovascular:  Hx HLD, Right bundle branch block  METS: 9.9  RCRI: 1 point, 6.0% risk for postoperative MACE   INDIO: 0.1% risk for 30 day postoperative MACE  EKG - 23  Normal Sinus Rhythm  Incomplete Right bundle branch block    Pulmonary:  No pulmonary diagnosis, however patient is at increased risk of perioperative complications secondary to  Tobacco abuse and marijuana.   Stop Bang score is 0 placing patient at low risk for AMADEO  ARISCAT: <26 points, 1.6% risk of in-hospital postoperative pulmonary complication  PRODIGY: Low risk for opioid induced respiratory depression  Smoking cessation discussed with patient, patient also provided cessation education/hotline handout, Pulmonary education discussed, patient also provided deep breathing exercises and educational handout    Renal:   No renal diagnosis or significant findings on chart review,  clinical presentation or evaluation. No further preoperative testing/intervention is indicated at this time.      Urological/GYN  Hx Pelvic pain  3/21/24 Tx Hidradenitis suppurativa infection  , tubal ligation  Right lower quadrant pain off and on since mid May.  Occasionally 10 out of 10    24 US Pelvis transabdominal with transvaginal  FINDINGS:  UTERUS:  The uterus measures  5.0 x 4.1 cm x 11.4 cm. Multiple fibroids are  noted, the largest in the fundus measures 1.5 x 1.8 x 1.4 cm.      ENDOMETRIUM:  The endometrium measures a thickness of 0.2 cm, which is normal.      RIGHT ADNEXA:  The right ovary measures 2.5 cm x 1.9 cm x 3.7 and demonstrates  normal flow. A benign follicular cyst is seen measuring 1.9 cm in  length.      LEFT ADNEXA:  The left ovary measures 1.5 cm x 1.7 cm x 2.9 cm and demonstrates  normal flow. No gross left adnexal masses are seen, no hydrosalpinx.      CUL DE SAC:  A trace amount of physiologic free fluid is seen within the pelvis.      IMPRESSION:  1. Multiple uterine fibroids, otherwise unremarkable pelvic  ultrasound.      Endocrine:  Hx hypothyroidism due to Hashimoto's on synthroid    Hematologic:  No hematologic diagnosis or significant findings on chart review or clinical presentation and evaluation. No further testing or intervention is indicated at this time.   Antiplatelet management   The patient is not currently receiving antiplatelet therapy.  Anticoagulation management  The patient is not currently receiving anticoagulation therapy. Patient provided with DVT educational handout.    Caprini Score 5, patient at High risk for perioperative DVT.  Patient provided with VTE education/handout.    Gastrointestinal:   Hx appendectomy  Eat-10 score 0      Infectious disease:   No infectious diagnosis or significant findings on chart review or clinical presentation and evaluation.       Musculoskeletal:   No diagnosis or significant findings on chart review or clinical  presentation and evaluation.     Anesthesia/Airway:  No anesthesia complications      Medication instructions and NPO guidelines reviewed with the patient.  All questions or concerns discussed and addressed.      Labs ordered   CBC  BMP

## 2024-07-31 RX ORDER — SODIUM CHLORIDE, SODIUM LACTATE, POTASSIUM CHLORIDE, CALCIUM CHLORIDE 600; 310; 30; 20 MG/100ML; MG/100ML; MG/100ML; MG/100ML
100 INJECTION, SOLUTION INTRAVENOUS CONTINUOUS
Status: CANCELLED | OUTPATIENT
Start: 2024-07-31

## 2024-07-31 RX ORDER — OXYCODONE HYDROCHLORIDE 5 MG/1
5 TABLET ORAL EVERY 4 HOURS PRN
Status: CANCELLED | OUTPATIENT
Start: 2024-07-31

## 2024-07-31 RX ORDER — ONDANSETRON HYDROCHLORIDE 2 MG/ML
4 INJECTION, SOLUTION INTRAVENOUS ONCE AS NEEDED
Status: CANCELLED | OUTPATIENT
Start: 2024-07-31

## 2024-07-31 RX ORDER — DROPERIDOL 2.5 MG/ML
0.62 INJECTION, SOLUTION INTRAMUSCULAR; INTRAVENOUS ONCE AS NEEDED
Status: CANCELLED | OUTPATIENT
Start: 2024-07-31

## 2024-08-01 ENCOUNTER — HOSPITAL ENCOUNTER (OUTPATIENT)
Facility: HOSPITAL | Age: 49
Setting detail: OUTPATIENT SURGERY
Discharge: HOME | End: 2024-08-01
Attending: OBSTETRICS & GYNECOLOGY | Admitting: OBSTETRICS & GYNECOLOGY
Payer: COMMERCIAL

## 2024-08-01 ENCOUNTER — ANESTHESIA (OUTPATIENT)
Dept: OPERATING ROOM | Facility: HOSPITAL | Age: 49
End: 2024-08-01
Payer: COMMERCIAL

## 2024-08-01 VITALS
HEART RATE: 62 BPM | DIASTOLIC BLOOD PRESSURE: 57 MMHG | TEMPERATURE: 96.8 F | WEIGHT: 161.05 LBS | SYSTOLIC BLOOD PRESSURE: 111 MMHG | HEIGHT: 66 IN | RESPIRATION RATE: 17 BRPM | BODY MASS INDEX: 25.88 KG/M2 | OXYGEN SATURATION: 94 %

## 2024-08-01 DIAGNOSIS — R10.2 PAIN IN FEMALE PELVIS: Primary | ICD-10-CM

## 2024-08-01 LAB — PREGNANCY TEST URINE, POC: NEGATIVE

## 2024-08-01 PROCEDURE — 2720000007 HC OR 272 NO HCPCS: Performed by: OBSTETRICS & GYNECOLOGY

## 2024-08-01 PROCEDURE — 7100000010 HC PHASE TWO TIME - EACH INCREMENTAL 1 MINUTE: Performed by: OBSTETRICS & GYNECOLOGY

## 2024-08-01 PROCEDURE — A49320 PR LAP,DIAGNOSTIC ABDOMEN: Performed by: NURSE ANESTHETIST, CERTIFIED REGISTERED

## 2024-08-01 PROCEDURE — 3600000003 HC OR TIME - INITIAL BASE CHARGE - PROCEDURE LEVEL THREE: Performed by: OBSTETRICS & GYNECOLOGY

## 2024-08-01 PROCEDURE — A49320 PR LAP,DIAGNOSTIC ABDOMEN: Performed by: ANESTHESIOLOGY

## 2024-08-01 PROCEDURE — 2500000004 HC RX 250 GENERAL PHARMACY W/ HCPCS (ALT 636 FOR OP/ED): Performed by: NURSE ANESTHETIST, CERTIFIED REGISTERED

## 2024-08-01 PROCEDURE — 3700000001 HC GENERAL ANESTHESIA TIME - INITIAL BASE CHARGE: Performed by: OBSTETRICS & GYNECOLOGY

## 2024-08-01 PROCEDURE — 3600000008 HC OR TIME - EACH INCREMENTAL 1 MINUTE - PROCEDURE LEVEL THREE: Performed by: OBSTETRICS & GYNECOLOGY

## 2024-08-01 PROCEDURE — 7100000009 HC PHASE TWO TIME - INITIAL BASE CHARGE: Performed by: OBSTETRICS & GYNECOLOGY

## 2024-08-01 PROCEDURE — 2500000005 HC RX 250 GENERAL PHARMACY W/O HCPCS: Performed by: NURSE ANESTHETIST, CERTIFIED REGISTERED

## 2024-08-01 PROCEDURE — 7100000002 HC RECOVERY ROOM TIME - EACH INCREMENTAL 1 MINUTE: Performed by: OBSTETRICS & GYNECOLOGY

## 2024-08-01 PROCEDURE — 49320 DIAG LAPARO SEPARATE PROC: CPT | Performed by: OBSTETRICS & GYNECOLOGY

## 2024-08-01 PROCEDURE — 2500000001 HC RX 250 WO HCPCS SELF ADMINISTERED DRUGS (ALT 637 FOR MEDICARE OP): Performed by: OBSTETRICS & GYNECOLOGY

## 2024-08-01 PROCEDURE — 81025 URINE PREGNANCY TEST: CPT | Performed by: ANESTHESIOLOGY

## 2024-08-01 PROCEDURE — 7100000001 HC RECOVERY ROOM TIME - INITIAL BASE CHARGE: Performed by: OBSTETRICS & GYNECOLOGY

## 2024-08-01 PROCEDURE — 2500000004 HC RX 250 GENERAL PHARMACY W/ HCPCS (ALT 636 FOR OP/ED): Performed by: ANESTHESIOLOGY

## 2024-08-01 PROCEDURE — 2500000004 HC RX 250 GENERAL PHARMACY W/ HCPCS (ALT 636 FOR OP/ED): Performed by: OBSTETRICS & GYNECOLOGY

## 2024-08-01 PROCEDURE — 3700000002 HC GENERAL ANESTHESIA TIME - EACH INCREMENTAL 1 MINUTE: Performed by: OBSTETRICS & GYNECOLOGY

## 2024-08-01 RX ORDER — CELECOXIB 400 MG/1
400 CAPSULE ORAL ONCE
Status: COMPLETED | OUTPATIENT
Start: 2024-08-01 | End: 2024-08-01

## 2024-08-01 RX ORDER — BUPIVACAINE HYDROCHLORIDE 5 MG/ML
INJECTION, SOLUTION EPIDURAL; INTRACAUDAL AS NEEDED
Status: DISCONTINUED | OUTPATIENT
Start: 2024-08-01 | End: 2024-08-01 | Stop reason: HOSPADM

## 2024-08-01 RX ORDER — MIDAZOLAM HYDROCHLORIDE 1 MG/ML
INJECTION INTRAMUSCULAR; INTRAVENOUS AS NEEDED
Status: DISCONTINUED | OUTPATIENT
Start: 2024-08-01 | End: 2024-08-01

## 2024-08-01 RX ORDER — ONDANSETRON HYDROCHLORIDE 2 MG/ML
INJECTION, SOLUTION INTRAVENOUS AS NEEDED
Status: DISCONTINUED | OUTPATIENT
Start: 2024-08-01 | End: 2024-08-01

## 2024-08-01 RX ORDER — ACETAMINOPHEN 325 MG/1
975 TABLET ORAL ONCE
Status: COMPLETED | OUTPATIENT
Start: 2024-08-01 | End: 2024-08-01

## 2024-08-01 RX ORDER — PROPOFOL 10 MG/ML
INJECTION, EMULSION INTRAVENOUS AS NEEDED
Status: DISCONTINUED | OUTPATIENT
Start: 2024-08-01 | End: 2024-08-01

## 2024-08-01 RX ORDER — ROCURONIUM BROMIDE 10 MG/ML
INJECTION, SOLUTION INTRAVENOUS AS NEEDED
Status: DISCONTINUED | OUTPATIENT
Start: 2024-08-01 | End: 2024-08-01

## 2024-08-01 RX ORDER — GABAPENTIN 300 MG/1
600 CAPSULE ORAL ONCE
Status: COMPLETED | OUTPATIENT
Start: 2024-08-01 | End: 2024-08-01

## 2024-08-01 RX ORDER — FENTANYL CITRATE 50 UG/ML
INJECTION, SOLUTION INTRAMUSCULAR; INTRAVENOUS AS NEEDED
Status: DISCONTINUED | OUTPATIENT
Start: 2024-08-01 | End: 2024-08-01

## 2024-08-01 RX ORDER — LIDOCAINE HYDROCHLORIDE 10 MG/ML
INJECTION, SOLUTION EPIDURAL; INFILTRATION; INTRACAUDAL; PERINEURAL AS NEEDED
Status: DISCONTINUED | OUTPATIENT
Start: 2024-08-01 | End: 2024-08-01

## 2024-08-01 RX ORDER — KETOROLAC TROMETHAMINE 30 MG/ML
INJECTION, SOLUTION INTRAMUSCULAR; INTRAVENOUS AS NEEDED
Status: DISCONTINUED | OUTPATIENT
Start: 2024-08-01 | End: 2024-08-01

## 2024-08-01 RX ORDER — HYDROMORPHONE HYDROCHLORIDE 2 MG/ML
INJECTION, SOLUTION INTRAMUSCULAR; INTRAVENOUS; SUBCUTANEOUS AS NEEDED
Status: DISCONTINUED | OUTPATIENT
Start: 2024-08-01 | End: 2024-08-01

## 2024-08-01 RX ORDER — SODIUM CHLORIDE, SODIUM LACTATE, POTASSIUM CHLORIDE, CALCIUM CHLORIDE 600; 310; 30; 20 MG/100ML; MG/100ML; MG/100ML; MG/100ML
100 INJECTION, SOLUTION INTRAVENOUS CONTINUOUS
Status: DISCONTINUED | OUTPATIENT
Start: 2024-08-01 | End: 2024-08-01 | Stop reason: HOSPADM

## 2024-08-01 SDOH — HEALTH STABILITY: MENTAL HEALTH: CURRENT SMOKER: 1

## 2024-08-01 ASSESSMENT — PAIN SCALES - GENERAL
PAINLEVEL_OUTOF10: 0 - NO PAIN
PAINLEVEL_OUTOF10: 0 - NO PAIN
PAIN_LEVEL: 1

## 2024-08-01 ASSESSMENT — PAIN - FUNCTIONAL ASSESSMENT
PAIN_FUNCTIONAL_ASSESSMENT: 0-10
PAIN_FUNCTIONAL_ASSESSMENT: 0-10

## 2024-08-01 NOTE — OP NOTE
Date: 2024  OR Location: [facility] OR    Name: Sammi Miranda, : 1975, Age: 48 y.o., MRN: [id number], Sex: female    Diagnosis  Pre-op Diagnosis      * Pain in female pelvis [R10.2] Post-op Diagnosis     * Pain in female pelvis [R10.2]     Procedures  EXPLORATION LAPAROSCOPY  15424 - IL LAPS ABD PRTM&OMENTUM DX W/WO SPEC BR/WA SPX    Under general anesthetic lithotomy position patient prepped draped usual manner.  In-N-Out catheterization done of the bladder.  Marcaine used for skin incisions.  Through an infraumbilical incision Veress needle inserted peritoneal cavity insufflated.  5 mm trocar inserted laparoscope used to visualize the pelvis.  Additional 5 mm left lower quadrant port placed.  Findings were irregularly enlarged uterus with a fundal fibroid coming off the left side of the fundus.  Tubes and ovaries normal [place] normal area of her prior appendectomy was normal there is no significant adhesions endometriosis or acute pathology other than her fibroid uterus.    Female F exsufflated instruments removed.  Skin incisions reapproximated.  Transferred to recovery room in stable condition  Surgeons      * Franklyn Gould - Primary    Resident/Fellow/Other Assistant:  Surgeons and Role:  * No surgeons found with a matching role *    Procedure Summary  Anesthesia: General  ASA: II  Anesthesia Staff: Anesthesiologist: Yolie Appiah DO  CRNA: SIDDHARTHA Roper-CRNA  Estimated Blood Loss: 5mL  Intra-op Medications:   Administrations occurring from 1045 to 1145 on 24:   Medication Name Total Dose   bupivacaine PF (Marcaine) 0.5 % (5 mg/mL) injection 1 mL              Anesthesia Record               Intraprocedure I/O Totals       None           Specimen: No specimens collected     Staff:   Scrub Person: Eugene  Circulator: Radha  Circulator: Jolene         Procedure Details:  The patient was seen in the preoperative area. The site of surgery was properly noted/marked if necessary per policy. The  patient has been actively warmed in preoperative area. Preoperative antibiotics are not indicated. Venous thrombosis prophylaxis have been ordered including bilateral sequential compression devices    Findings: Fibroid uterus    Complications:  None; patient tolerated the procedure well.     Disposition: PACU - hemodynamically stable.  Condition: stable

## 2024-08-01 NOTE — ANESTHESIA PREPROCEDURE EVALUATION
Patient: Sammi MARRERO Fee    Procedure Information       Date/Time: 08/01/24 1045    Procedure: EXPLORATION LAPAROSCOPY    Location: [facility] OR 07 / Virtual [facility] OR    Surgeons: Franklyn Gould MD            Relevant Problems   Anesthesia (within normal limits)      Cardiac   (+) Incomplete right bundle branch block   (+) Mixed hyperlipidemia      Neuro   (+) Anxiety disorder      Endocrine   (+) Hypothyroidism   (+) Hypothyroidism due to Hashimoto's thyroiditis      ID   (+) Vulval hidradenitis suppurativa       Clinical information reviewed:   Tobacco  Allergies  Meds  Problems  Med Hx  Surg Hx   Fam Hx  Soc   Hx        NPO Detail:  NPO/Void Status  Date of Last Liquid: 07/31/24  Time of Last Liquid: 2130  Date of Last Solid: 07/31/24  Time of Last Solid: 2130  Last Intake Type: Clear fluids; Solid meal  Time of Last Void: 0939         Physical Exam    Airway  Mallampati: II  TM distance: >3 FB  Neck ROM: full     Cardiovascular - normal exam  Rhythm: regular  Rate: normal     Dental   (+) upper dentures     Pulmonary - normal exam  Breath sounds clear to auscultation     Abdominal - normal exam  Abdomen: soft  Bowel sounds: normal           Anesthesia Plan    History of general anesthesia?: yes  History of complications of general anesthesia?: no    ASA 2     general     The patient is a current smoker.  Patient was previously instructed to abstain from smoking on day of procedure.  Patient smoked on day of procedure.    intravenous induction   Anesthetic plan and risks discussed with patient.  Use of blood products discussed with patient who consented to blood products.    Plan discussed with CRNA.

## 2024-08-01 NOTE — ANESTHESIA PROCEDURE NOTES
Airway  Date/Time: 8/1/2024 11:05 AM  Urgency: elective    Airway not difficult    Staffing  Performed: CRNA   Authorized by: Yolie Appiah DO    Performed by: SIDDHARTHA Roper-BRIANNA  Patient location during procedure: OR    Indications and Patient Condition  Indications for airway management: anesthesia  Spontaneous Ventilation: absent  Sedation level: deep  Preoxygenated: yes  Patient position: sniffing  MILS maintained throughout  Mask difficulty assessment: 1 - vent by mask  Planned trial extubation    Final Airway Details  Final airway type: endotracheal airway      Successful airway: ETT  Cuffed: yes   Successful intubation technique: video laryngoscopy (Marin)  Facilitating devices/methods: intubating stylet  Endotracheal tube insertion site: oral  Blade size: #3  ETT size (mm): 7.5  Cormack-Lehane Classification: grade I - full view of glottis  Placement verified by: chest auscultation and capnometry   Cuff volume (mL): 9  Measured from: teeth  ETT to teeth (cm): 21  Number of attempts at approach: 1  Number of other approaches attempted: 0           X Size Of Lesion In Cm (Optional): 0

## 2024-08-01 NOTE — DISCHARGE INSTRUCTIONS
A video has been emailed to you regarding your laparoscopy    Please call Dr. Gould's office to schedule a follow-up appointment for 6-8 weeks from now.    May return to normal activity on Monday    The anesthetics, sedatives or narcotics which were given to you today will be acting in your body for the next 24 hours, so you might feel a little sleepy or groggy. This feeling should slowly wear off. Carefully read and follow the instructions.     You received sedation today:  - Do not drive or operate any machinery or power tools of any kind.   - No alcoholic beverages today, not even beer or wine.  - Do not make any important decisions or sign any legal documents.  - No over the counter medications that contain alcohol or that may cause drowsiness.    Call your doctor's office to be advised whether a visit to your nearest Urgent Care or Emergency Department is indicated if you experience any of the following. Take this paper with you if you go.     - If you develop an allergic reaction to the medications that were given during your procedure such as difficulty breathing, rash, hives, severe nausea, vomiting or lightheadedness.  - If you experience chest pain, shortness of breath, fevers and chills.  -If you develop signs and symptoms of excessive bleeding  - If you have not urinated within 8 hours following your procedure.  - If your IV site becomes painful, red, inflamed, or looks infected.    Nurse Signature Date___________________   Patient/Responsible Party Signature Date___________________

## 2024-08-01 NOTE — ANESTHESIA POSTPROCEDURE EVALUATION
Patient: Sammi Miranda    Procedure Summary       Date: 08/01/24 Room / Location: [facility] OR [facility] / [facility]    Anesthesia Start: 1052 Anesthesia Stop: 1133    Procedure: EXPLORATION LAPAROSCOPY Diagnosis:       Pain in female pelvis      (Pain in female pelvis [R10.2])    Surgeons: Franklyn Gould MD Responsible Provider: Yolie Apipah DO    Anesthesia Type: general ASA Status: 2            Anesthesia Type: general    Vitals Value Taken Time   /57 08/01/24 1145   Temp 36 °C (96.8 °F) 08/01/24 1130   Pulse 58 08/01/24 1148   Resp 17 08/01/24 1145   SpO2 96 % 08/01/24 1148   Vitals shown include unfiled device data.    Anesthesia Post Evaluation    Patient location during evaluation: PACU  Patient participation: complete - patient participated  Level of consciousness: awake  Pain score: 1  Pain management: adequate  Airway patency: patent  Cardiovascular status: acceptable  Respiratory status: acceptable  Hydration status: acceptable  Postoperative Nausea and Vomiting: none        No notable events documented.

## 2024-08-01 NOTE — H&P
History Of Present Illness  Sammi Miranda is a 48 y.o. female presenting with    Franklyn Gould MD  Physician  Obstetrics     Progress Notes     Signed     Encounter Date: 2024     Signed       Expand All Collapse All       Subjective  Patient ID: Sammi Miranda is a 48 y.o. female who presents for Follow-up.  Review of my previous note,     Franklyn Gould MD  Physician  Obstetrics     Progress Notes     Signed     Encounter Date: 2024     Signed      Expand All Collapse All        Subjective   Patient ID: Sammi Miranda is a 48 y.o. female who presents for Pelvic Pain.  Established patient 48 years old.  Endometrial ablation 2023 no menses since then     Surgeries include , tubal ligation, appendectomy     Since mid May she has been having right-sided pain in the area of her ovary.  Initially made her double over when she had the pain has been off and on since and then again recently at work she was doubled over rating the pain a 10 out of 10 and rates it 3 out of 10     No fever chills nausea vomiting diarrhea no other associated symptoms.     Will start with a pelvic ultrasound and then have her follow-up.  Depending on findings we may need to consider laparoscopy or consultation     Pelvic Pain  The patient's primary symptoms include pelvic pain.         Review of Systems   Genitourinary:  Positive for pelvic pain.               Objective   Physical Exam  Constitutional:       Appearance: Normal appearance. She is normal weight.   Neurological:      Mental Status: She is alert.                  Assessment/Plan   Right lower quadrant pain off and on since mid May.  Occasionally 10 out of 10.  Start with pelvic ultrasound.  Follow-up to review results and recommendations        Franklyn Gould MD 24 9:48 AM                  Electronically signed by Franklyn Gould MD at 2024  9:50 AM        Review of recent ultrasound, consistent with fibroids,  US PELVIS TRANSABDOMINAL WITH TRANSVAGINAL  Status:  Final result     Study Result     Narrative & Impression  Interpreted By:  Antony Bardales,   STUDY:  US PELVIS TRANSABDOMINAL WITH TRANSVAGINAL;  6/28/2024 9:39 am      INDICATION:  Signs/Symptoms:Right lower quadrant pain.      COMPARISON:  04/14/2023      ACCESSION NUMBER(S):  [id number]      ORDERING CLINICIAN:  XAVIER PETER      TECHNIQUE:  Multiple multiplanar static gray scale, color and spectral waveform  sonographic images of the pelvis were obtained.      FINDINGS:  UTERUS:  The uterus measures  5.0 x 4.1 cm x 11.4 cm. Multiple fibroids are  noted, the largest in the fundus measures 1.5 x 1.8 x 1.4 cm.      ENDOMETRIUM:  The endometrium measures a thickness of 0.2 cm, which is normal.      RIGHT ADNEXA:  The right ovary measures 2.5 cm x 1.9 cm x 3.7 and demonstrates  normal flow. A benign follicular cyst is seen measuring 1.9 cm in  length.      LEFT ADNEXA:  The left ovary measures 1.5 cm x 1.7 cm x 2.9 cm and demonstrates  normal flow. No gross left adnexal masses are seen, no hydrosalpinx.      CUL DE SAC:  A trace amount of physiologic free fluid is seen within the pelvis.      IMPRESSION:  1. Multiple uterine fibroids, otherwise unremarkable pelvic  ultrasound.      MACRO:  None      Signed by: Antony Bardales 6/29/2024 9:56 AM  Dictation workstation:   [id number]           Abdominal Pain  This is a new problem. The current episode started more than 1 month ago. The onset quality is sudden. The problem occurs intermittently. The pain is located in the right flank. The pain is at a severity of 10/10. The quality of the pain is sharp. The abdominal pain does not radiate. Pertinent negatives include no anorexia, arthralgias, belching, constipation, diarrhea, dysuria, fever, flatus, frequency, headaches, hematochezia, hematuria, melena, myalgias, nausea, vomiting or weight loss. Nothing aggravates the pain. The pain is relieved by Nothing. Prior diagnostic workup includes ultrasound.         Review  of Systems   Constitutional:  Negative for fever and weight loss.   Gastrointestinal:  Positive for abdominal pain. Negative for anorexia, constipation, diarrhea, flatus, hematochezia, melena, nausea and vomiting.   Genitourinary:  Negative for dysuria, frequency and hematuria.   Musculoskeletal:  Negative for arthralgias and myalgias.   Neurological:  Negative for headaches.               Objective  Physical Exam           Assessment/Plan  2 months of ongoing pain.  The last few days she is been better.  Ultrasound confirms fibroid.  Reviewed options including observation, trial of medications including GnRH antagonist.  She does smoke.  Or laparoscopy to assess for other possible conditions and see if we can improve her pain.  She wants to proceed with laparoscopy.  Reviewed surgery risk benefits complications recovery.  No medical allergies        Franklyn Gould MD 24 10:09 AM             .     Past Medical History  Past Medical History:   Diagnosis Date    Abnormal uterine bleeding (AUB)     Anxiety     Hidradenitis suppurativa     Hyperlipidemia     Hypothyroidism     Hypothyroidism due to Hashimoto's thyroiditis    Pain in female pelvis     Pelvic pain     Vulvar lesion     Vulval hidradenitis suppurativa       Surgical History  Past Surgical History:   Procedure Laterality Date    APPENDECTOMY       SECTION, CLASSIC      DENTAL IMPLANT      HYSTEROSCOPY      D&C, endometrial ablation    SKIN LESION EXCISION  2024    FEMALE GENITALIA        Social History  She reports that she has been smoking cigarettes. She has a 7.5 pack-year smoking history. She has never used smokeless tobacco. She reports current alcohol use. She reports current drug use. Drug: Marijuana.    Family History  No family history on file.     Allergies  Patient has no known allergies.    Review of Systems     Physical Exam     Last Recorded Vitals  Blood pressure 116/60, pulse 72, temperature 36 °C (96.8 °F), resp. rate 14,  "height 1.676 m (5' 6\"), weight 73.1 kg (161 lb 0.7 oz), SpO2 99%.    Relevant Results             Assessment/Plan   Principal Problem:    Pain in female pelvis      Laparoscopy for ongoing pelvic pain.  Mainly right-sided       I spent 10 minutes in the professional and overall care of this patient.      Franklyn Gould MD    "

## 2025-03-31 ASSESSMENT — ENCOUNTER SYMPTOMS
BELCHING: 0
DYSURIA: 0
NAUSEA: 0
FLATUS: 1
FREQUENCY: 0
ANOREXIA: 0
VOMITING: 0
HEADACHES: 0
MYALGIAS: 1
ARTHRALGIAS: 1
DIARRHEA: 0
HEMATURIA: 0
FEVER: 0
HEMATOCHEZIA: 0
WEIGHT LOSS: 0
CONSTIPATION: 0
ABDOMINAL PAIN: 1

## 2025-04-01 ENCOUNTER — APPOINTMENT (OUTPATIENT)
Dept: OBSTETRICS AND GYNECOLOGY | Facility: CLINIC | Age: 50
End: 2025-04-01
Payer: COMMERCIAL

## 2025-04-01 VITALS
SYSTOLIC BLOOD PRESSURE: 118 MMHG | HEIGHT: 66 IN | WEIGHT: 175 LBS | BODY MASS INDEX: 28.12 KG/M2 | DIASTOLIC BLOOD PRESSURE: 78 MMHG

## 2025-04-01 DIAGNOSIS — N95.1 MENOPAUSAL SYMPTOMS: ICD-10-CM

## 2025-04-01 DIAGNOSIS — R10.2 PAIN IN FEMALE PELVIS: Primary | ICD-10-CM

## 2025-04-01 PROCEDURE — 99214 OFFICE O/P EST MOD 30 MIN: CPT | Performed by: OBSTETRICS & GYNECOLOGY

## 2025-04-01 PROCEDURE — 3008F BODY MASS INDEX DOCD: CPT | Performed by: OBSTETRICS & GYNECOLOGY

## 2025-04-01 ASSESSMENT — ENCOUNTER SYMPTOMS
BELCHING: 0
FREQUENCY: 0
FEVER: 0
DIARRHEA: 0
WEIGHT LOSS: 0
NAUSEA: 0
FLATUS: 1
ARTHRALGIAS: 1
HEMATOCHEZIA: 0
MYALGIAS: 1
HEADACHES: 0
DYSURIA: 0
ANOREXIA: 0
HEMATURIA: 0
VOMITING: 0
ABDOMINAL PAIN: 1
CONSTIPATION: 0

## 2025-04-01 NOTE — PROGRESS NOTES
Subjective   Patient ID: Sammi Miranda is a 49 y.o. female who presents for Pelvic Pain.  Review of recent emergency room note from ,    [facility]  Outside Information  Joseph Martínez MD  Physician     ED Provider Notes     Signed     Date of Service: 3/14/2025  3:37 PM  Note Received: 2025  3:50 PM    Signed        ED Provider Note  Patient Name: Sammi Miranda                                                        MRN: [id number]  : 1975  SERVICE DATE: 3/14/25          History     Patient presents with:  Abdominal Pain        49-year-old female presented ER for evaluation of right lower abdominal and pelvic pain.  States started noticing symptoms about 2 weeks ago.  Initially pain was intermittent, seemingly random but when it comes on it is somewhat sharp and severe.  It has been worse over the last 2 days, more persistent.  She reports she has had tube ligation previously but still has both ovaries.  She has had previous appendectomy as well.  She is not sure if symptoms could be related to an ovarian cyst.  She tried following up with her OB office today but they were closed.  She is not had any fevers or chills.  No nausea or vomiting, bowel or bladder changes.  Denies any new vaginal bleeding or discharge.                PAST MEDICAL HISTORY  Diagnosis Date  · Abnormal Pap smear of cervix    · Genital warts    · Hypothyroidism due to Hashimoto's thyroiditis 2018  · NEGATIVE MEDICAL HISTORY    · Ovarian cyst            PAST SURGICAL HISTORY  Procedure Laterality Date  · ABDOMINAL SURGERY HX      · APPENDECTOMY HX      ·  SECTION HX      · LIPOSUCTION, STOMACH      · TUBAL LIGATION HX              FAMILY HISTORY   Problem Relation Age of Onset  · Arthritis Mother          RA  · Diabetes Father    · Hypertension Father    · Breast Cancer Maternal Grandmother            Social History       Tobacco Use  · Smoking status: Every Day      Current packs/day: 0.50      Average  "packs/day: 0.5 packs/day for 20.0 years (10.0 ttl pk-yrs)      Types: Cigarettes  · Smokeless tobacco: Never  Substance and Sexual Activity  · Alcohol use: Yes      Alcohol/week: 4.0 standard drinks of alcohol      Types: 2 Cans of beer, 2 Shots of liquor per week      Comment: two times a week  · Drug use: Yes      Types: Marijuana      Comment: occ  · Sexual activity: Yes      Partners: Male      Birth control/protection: Tubal Ligation      Comment: single        ALLERGIES  No Known Allergies     Review of Systems   Constitutional:         Pertinent positive negative review of systems per HPI.           Physical Exam       Vitals  BP Pulse Temp Temp src Resp SpO2 Weight Height  03/14/25 1331 03/14/25 1330 03/14/25 1330 03/14/25 1330 03/14/25 1330 03/14/25 1330 03/14/25 1330 03/14/25 1330  119/58 77 36.7 °C (98 °F) Temporal 20 99 % 74.8 kg (165 lb) 1.676 m (5' 6\")        Physical Exam  Vitals reviewed.   Constitutional:       General: She is not in acute distress.     Appearance: She is not toxic-appearing.   HENT:      Head: Normocephalic and atraumatic.      Right Ear: External ear normal.      Left Ear: External ear normal.      Nose: Nose normal. No congestion.      Mouth/Throat:      Pharynx: Oropharynx is clear.   Eyes:      General:         Right eye: No discharge.         Left eye: No discharge.      Extraocular Movements: Extraocular movements intact.      Conjunctiva/sclera: Conjunctivae normal.   Cardiovascular:      Rate and Rhythm: Normal rate and regular rhythm.      Pulses: Normal pulses.      Heart sounds: No murmur heard.  Pulmonary:      Effort: Pulmonary effort is normal. No respiratory distress.      Breath sounds: No stridor. No wheezing or rhonchi.   Abdominal:      General: There is no distension.      Palpations: Abdomen is soft.      Tenderness: There is abdominal tenderness in the right lower quadrant. There is no guarding or rebound.           Comments: Right lower quadrant and right " lower pelvic pain.   Musculoskeletal:         General: No deformity.      Cervical back: Normal range of motion.      Right lower leg: No edema.      Left lower leg: No edema.   Skin:     General: Skin is warm and dry.      Capillary Refill: Capillary refill takes less than 2 seconds.      Coloration: Skin is not jaundiced.   Neurological:      Mental Status: She is alert and oriented to person, place, and time. Mental status is at baseline.      GCS: GCS eye subscore is 4. GCS verbal subscore is 5. GCS motor subscore is 6.   Psychiatric:         Behavior: Behavior normal.                                      Diagnostic Testing       ED Labs Ordered and Reviewed  COMPREHENSIVE METABOLIC PANEL - Abnormal; Notable for the following components:      Result Value Ref Range    Anion Gap 7 (*) 8 - 15 mmol/L    All other components within normal limits  COMPLETE BLOOD COUNT AND DIFFERENTIAL - Abnormal; Notable for the following components:    Abs Neut  7.56 (*) 1.45 - 7.50 k/uL    All other components within normal limits  HCG, QUALITATIVE, URINE PREGNANCY - Normal  URINALYSIS (WITH MICROSCOPIC) WITH CULTURE IF INDICATED                              Procedures       ED Course / Clinical Impression       Clinical Impressions as of 03/14/25 1605  Pelvic pain  Right lower quadrant abdominal pain                            MDM / Disposition / Plan                                49-year-old female presented ER for evaluation of right lower abdominal and pelvic pain.  Arrival she is afebrile he medically stable.     Differential diagnosis: As below  Plan: Abdominal laboratory reported through triage     Blood counts, renal function, electrolytes all stable.  Urine pregnancy test negative.  Urinalysis negative for UTI or hematuria.  Discussed blood work with patient on my initial evaluation.  Discussed she would likely benefit from pelvic ultrasound for further evaluation of possible pelvic etiology including ovarian cyst,  possibly torsion or intermittent torsion.  Patient initially amenable but then states she does not feel like she can wait in the ER for several more hours to have the ultrasound completed and read.  Initially she asked to be called with the results of the ultrasound.  I did order the ultrasound but she subsequently decided she could not wait any longer to even have the ultrasound done and signed out AMA.        Differential Diagnoses     - Ovarian cyst is more likely for the following reason(s): suggested by H&P  - Intermittent ovarian torsion      - Tubo-ovarian abscess is less likely for the following reason(s): H&P not suggestive  - Appendicitis is less likely for the following reason(s): Previous appendectomy     Disposition     The patient was AMA.        Counseled patient regarding lab results and suspected diagnosis.                    SIGNATURE:  Joseph Martínez MD           -        JOSEPH MARTÍNEZ  03/14/25 1602          Ultrasound from last June shows multiple small fibroids,  US PELVIS TRANSABDOMINAL WITH TRANSVAGINAL  Status: Final result    Study Result    Narrative & Impression  Interpreted By:  Antony Bardales,   STUDY:  US PELVIS TRANSABDOMINAL WITH TRANSVAGINAL;  6/28/2024 9:39 am      INDICATION:  Signs/Symptoms:Right lower quadrant pain.      COMPARISON:  04/14/2023      ACCESSION NUMBER(S):  [id number]      ORDERING CLINICIAN:  XAVIER PETER      TECHNIQUE:  Multiple multiplanar static gray scale, color and spectral waveform  sonographic images of the pelvis were obtained.      FINDINGS:  UTERUS:  The uterus measures  5.0 x 4.1 cm x 11.4 cm. Multiple fibroids are  noted, the largest in the fundus measures 1.5 x 1.8 x 1.4 cm.      ENDOMETRIUM:  The endometrium measures a thickness of 0.2 cm, which is normal.      RIGHT ADNEXA:  The right ovary measures 2.5 cm x 1.9 cm x 3.7 and demonstrates  normal flow. A benign follicular cyst is seen measuring 1.9 cm in  length.      LEFT ADNEXA:  The left  ovary measures 1.5 cm x 1.7 cm x 2.9 cm and demonstrates  normal flow. No gross left adnexal masses are seen, no hydrosalpinx.      CUL DE SAC:  A trace amount of physiologic free fluid is seen within the pelvis.      IMPRESSION:  1. Multiple uterine fibroids, otherwise unremarkable pelvic  ultrasound.      MACRO:  None      Signed by: Antony Catia 2024 9:56 AM  Dictation workstation:   [id number]    Review of laparoscopy from ,     Franklyn Gould MD  Physician  Obstetrics     Op Note     Signed     Date of Service: 2024 11:12 AM  Case Time: Procedures: Surgeons:  2024 11:12 AM   EXPLORATION LAPAROSCOPY     Franklyn Gould MD           Signed       Date: 2024                        OR Location: [facility] OR     Name: Sammi Miranda, : 1975, Age: 48 y.o., MRN: [id number], Sex: female     Diagnosis    Pre-op Diagnosis      * Pain in female pelvis [R10.2] Post-op Diagnosis     * Pain in female pelvis [R10.2]     Procedures  EXPLORATION LAPAROSCOPY  69809 - TX LAPS ABD PRTM&OMENTUM DX W/WO SPEC BR/WA SPX     Under general anesthetic lithotomy position patient prepped draped usual manner.  In-N-Out catheterization done of the bladder.  Marcaine used for skin incisions.  Through an infraumbilical incision Veress needle inserted peritoneal cavity insufflated.  5 mm trocar inserted laparoscope used to visualize the pelvis.  Additional 5 mm left lower quadrant port placed.  Findings were irregularly enlarged uterus with a fundal fibroid coming off the left side of the fundus.  Tubes and ovaries normal cul-de-sacs normal area of her prior appendectomy was normal there is no significant adhesions endometriosis or acute pathology other than her fibroid uterus.     Female F exsufflated instruments removed.  Skin incisions reapproximated.  Transferred to recovery room in stable condition  Surgeons      * Franklyn Gould - Primary     Resident/Fellow/Other Assistant:  Surgeons and Role:  * No surgeons found with a  matching role *     Procedure Summary  Anesthesia: General               ASA: II  Anesthesia Staff: Anesthesiologist: Yolie Appiah DO  CRNA: SIDDHARTHA Roper-CRNA  Estimated Blood Loss: 5mL  Intra-op Medications:     Administrations occurring from 1045 to 1145 on 24:  Medication Name Total Dose  bupivacaine PF (Marcaine) 0.5 % (5 mg/mL) injection 1 mL                   Anesthesia Record                    Intraprocedure I/O Totals          None               Specimen: No specimens collected      Staff:   Scrub Person: Eugene  Circulator: Radha  Circulator: Jolene           Procedure Details:  The patient was seen in the preoperative area. The site of surgery was properly noted/marked if necessary per policy. The patient has been actively warmed in preoperative area. Preoperative antibiotics are not indicated. Venous thrombosis prophylaxis have been ordered including bilateral sequential compression devices     Findings: Fibroid uterus     Complications:  None; patient tolerated the procedure well.     Disposition: PACU - hemodynamically stable.  Condition: stable        Electronically signed by Franklyn Gould MD at 2024 11:25 AM         Admission (Discharged) on 2024     Endometrial ablation 2023,.  Has not had any menses since then.  Mainly having crampy pain in the area of the right ovary.  On for the past 2 months.  Sometimes lying in the fetal position.  Prior .    At this point I would like to see her hormonal status to see if she is in menopause or not.  We talked about there being hormonal causes of pain.  Obtain FSH and LH.  If she is not in menopause then we can try GnRH antagonist medication.  If she is in menopause then we may leave things alone versus removing the right tube and ovary, or hysterectomy    Pelvic Pain  The patient's primary symptoms include pelvic pain. Associated symptoms include abdominal pain. Pertinent negatives include no anorexia, constipation,  diarrhea, dysuria, fever, frequency, headaches, hematuria, nausea or vomiting.   Abdominal Pain  This is a chronic problem. The current episode started more than 1 year ago. The onset quality is sudden. The problem occurs every several days. The most recent episode lasted 2 days. The problem has been waxing and waning. The pain is located in the right flank. The pain is at a severity of 10/10. The quality of the pain is cramping. The abdominal pain radiates to the left flank. Associated symptoms include arthralgias, flatus and myalgias. Pertinent negatives include no anorexia, belching, constipation, diarrhea, dysuria, fever, frequency, headaches, hematochezia, hematuria, melena, nausea, vomiting or weight loss. Nothing aggravates the pain. The pain is relieved by Nothing.       Review of Systems   Constitutional:  Negative for fever and weight loss.   Gastrointestinal:  Positive for abdominal pain and flatus. Negative for anorexia, constipation, diarrhea, hematochezia, melena, nausea and vomiting.   Genitourinary:  Positive for pelvic pain. Negative for dysuria, frequency and hematuria.   Musculoskeletal:  Positive for arthralgias and myalgias.   Neurological:  Negative for headaches.       Objective   Physical Exam  Constitutional:       Appearance: Normal appearance. She is normal weight.   Neurological:      Mental Status: She is alert.         Assessment/Plan   History of , endometrial ablation, laparoscopy in 2024.  Recent emergency room visit.  Right sided pain.  Blood work and imaging have been normal.  Obtain FSH and LH.  Follow-up for annual exam.  Review results and recommendations at follow-up visit.  She is feeling that she had hot flashes year ago they have gone away but she feels like she is gaining weight having thinning of hair joint pain.  These could be menopausal or perimenopausal symptoms         Franklyn Gould MD 25 3:51 PM

## 2025-04-02 LAB
FSH SERPL-ACNC: 5.8 MIU/ML
LH SERPL-ACNC: 2.4 MIU/ML
TSH SERPL-ACNC: 2.05 MIU/L

## 2025-04-03 ENCOUNTER — APPOINTMENT (OUTPATIENT)
Dept: RADIOLOGY | Facility: HOSPITAL | Age: 50
End: 2025-04-03
Payer: COMMERCIAL

## 2025-04-03 ENCOUNTER — HOSPITAL ENCOUNTER (EMERGENCY)
Facility: HOSPITAL | Age: 50
Discharge: HOME | End: 2025-04-03
Payer: COMMERCIAL

## 2025-04-03 VITALS
OXYGEN SATURATION: 100 % | TEMPERATURE: 97 F | RESPIRATION RATE: 20 BRPM | BODY MASS INDEX: 28.12 KG/M2 | WEIGHT: 175 LBS | HEIGHT: 66 IN | DIASTOLIC BLOOD PRESSURE: 78 MMHG | SYSTOLIC BLOOD PRESSURE: 142 MMHG | HEART RATE: 85 BPM

## 2025-04-03 DIAGNOSIS — R10.31 RIGHT LOWER QUADRANT PAIN: Primary | ICD-10-CM

## 2025-04-03 LAB
ALBUMIN SERPL BCP-MCNC: 4.5 G/DL (ref 3.4–5)
ALP SERPL-CCNC: 61 U/L (ref 33–110)
ALT SERPL W P-5'-P-CCNC: 13 U/L (ref 7–45)
ANION GAP SERPL CALC-SCNC: 12 MMOL/L (ref 10–20)
APPEARANCE UR: ABNORMAL
AST SERPL W P-5'-P-CCNC: 17 U/L (ref 9–39)
B-HCG SERPL-ACNC: <2 MIU/ML
BASOPHILS # BLD AUTO: 0.07 X10*3/UL (ref 0–0.1)
BASOPHILS NFR BLD AUTO: 0.7 %
BILIRUB SERPL-MCNC: 0.3 MG/DL (ref 0–1.2)
BILIRUB UR STRIP.AUTO-MCNC: NEGATIVE MG/DL
BUN SERPL-MCNC: 14 MG/DL (ref 6–23)
CALCIUM SERPL-MCNC: 9.2 MG/DL (ref 8.6–10.3)
CHLORIDE SERPL-SCNC: 103 MMOL/L (ref 98–107)
CO2 SERPL-SCNC: 25 MMOL/L (ref 21–32)
COLOR UR: ABNORMAL
CREAT SERPL-MCNC: 0.66 MG/DL (ref 0.5–1.05)
EGFRCR SERPLBLD CKD-EPI 2021: >90 ML/MIN/1.73M*2
EOSINOPHIL # BLD AUTO: 0.15 X10*3/UL (ref 0–0.7)
EOSINOPHIL NFR BLD AUTO: 1.5 %
ERYTHROCYTE [DISTWIDTH] IN BLOOD BY AUTOMATED COUNT: 13.2 % (ref 11.5–14.5)
GLUCOSE SERPL-MCNC: 88 MG/DL (ref 74–99)
GLUCOSE UR STRIP.AUTO-MCNC: NORMAL MG/DL
HCT VFR BLD AUTO: 40.9 % (ref 36–46)
HGB BLD-MCNC: 13.7 G/DL (ref 12–16)
IMM GRANULOCYTES # BLD AUTO: 0.05 X10*3/UL (ref 0–0.7)
IMM GRANULOCYTES NFR BLD AUTO: 0.5 % (ref 0–0.9)
KETONES UR STRIP.AUTO-MCNC: NEGATIVE MG/DL
LEUKOCYTE ESTERASE UR QL STRIP.AUTO: NEGATIVE
LIPASE SERPL-CCNC: 43 U/L (ref 9–82)
LYMPHOCYTES # BLD AUTO: 2.71 X10*3/UL (ref 1.2–4.8)
LYMPHOCYTES NFR BLD AUTO: 26.6 %
MAGNESIUM SERPL-MCNC: 2.03 MG/DL (ref 1.6–2.4)
MCH RBC QN AUTO: 30 PG (ref 26–34)
MCHC RBC AUTO-ENTMCNC: 33.5 G/DL (ref 32–36)
MCV RBC AUTO: 90 FL (ref 80–100)
MONOCYTES # BLD AUTO: 0.7 X10*3/UL (ref 0.1–1)
MONOCYTES NFR BLD AUTO: 6.9 %
MUCOUS THREADS #/AREA URNS AUTO: NORMAL /LPF
NEUTROPHILS # BLD AUTO: 6.49 X10*3/UL (ref 1.2–7.7)
NEUTROPHILS NFR BLD AUTO: 63.8 %
NITRITE UR QL STRIP.AUTO: NEGATIVE
NRBC BLD-RTO: 0 /100 WBCS (ref 0–0)
PH UR STRIP.AUTO: 5.5 [PH]
PLATELET # BLD AUTO: 368 X10*3/UL (ref 150–450)
POTASSIUM SERPL-SCNC: 4.2 MMOL/L (ref 3.5–5.3)
PROT SERPL-MCNC: 7 G/DL (ref 6.4–8.2)
PROT UR STRIP.AUTO-MCNC: NEGATIVE MG/DL
RBC # BLD AUTO: 4.56 X10*6/UL (ref 4–5.2)
RBC # UR STRIP.AUTO: ABNORMAL MG/DL
RBC #/AREA URNS AUTO: NORMAL /HPF
SODIUM SERPL-SCNC: 136 MMOL/L (ref 136–145)
SP GR UR STRIP.AUTO: 1.02
SQUAMOUS #/AREA URNS AUTO: NORMAL /HPF
UROBILINOGEN UR STRIP.AUTO-MCNC: NORMAL MG/DL
WBC # BLD AUTO: 10.2 X10*3/UL (ref 4.4–11.3)
WBC #/AREA URNS AUTO: NORMAL /HPF

## 2025-04-03 PROCEDURE — 83690 ASSAY OF LIPASE: CPT | Performed by: NURSE PRACTITIONER

## 2025-04-03 PROCEDURE — 81001 URINALYSIS AUTO W/SCOPE: CPT | Performed by: NURSE PRACTITIONER

## 2025-04-03 PROCEDURE — 74177 CT ABD & PELVIS W/CONTRAST: CPT | Mod: FOREIGN READ | Performed by: RADIOLOGY

## 2025-04-03 PROCEDURE — 85025 COMPLETE CBC W/AUTO DIFF WBC: CPT | Performed by: NURSE PRACTITIONER

## 2025-04-03 PROCEDURE — 74177 CT ABD & PELVIS W/CONTRAST: CPT

## 2025-04-03 PROCEDURE — 76856 US EXAM PELVIC COMPLETE: CPT | Performed by: RADIOLOGY

## 2025-04-03 PROCEDURE — 76830 TRANSVAGINAL US NON-OB: CPT | Performed by: RADIOLOGY

## 2025-04-03 PROCEDURE — 99285 EMERGENCY DEPT VISIT HI MDM: CPT | Mod: 25

## 2025-04-03 PROCEDURE — 2550000001 HC RX 255 CONTRASTS: Performed by: NURSE PRACTITIONER

## 2025-04-03 PROCEDURE — 83735 ASSAY OF MAGNESIUM: CPT | Performed by: NURSE PRACTITIONER

## 2025-04-03 PROCEDURE — 76856 US EXAM PELVIC COMPLETE: CPT

## 2025-04-03 PROCEDURE — 76830 TRANSVAGINAL US NON-OB: CPT

## 2025-04-03 PROCEDURE — 36415 COLL VENOUS BLD VENIPUNCTURE: CPT | Performed by: NURSE PRACTITIONER

## 2025-04-03 PROCEDURE — 80053 COMPREHEN METABOLIC PANEL: CPT | Performed by: NURSE PRACTITIONER

## 2025-04-03 PROCEDURE — 84702 CHORIONIC GONADOTROPIN TEST: CPT | Performed by: NURSE PRACTITIONER

## 2025-04-03 RX ORDER — HYDROCODONE BITARTRATE AND ACETAMINOPHEN 5; 325 MG/1; MG/1
1 TABLET ORAL EVERY 6 HOURS PRN
Qty: 6 TABLET | Refills: 0 | Status: SHIPPED | OUTPATIENT
Start: 2025-04-03 | End: 2025-04-06

## 2025-04-03 RX ADMIN — IOHEXOL 75 ML: 350 INJECTION, SOLUTION INTRAVENOUS at 14:21

## 2025-04-03 ASSESSMENT — LIFESTYLE VARIABLES
EVER HAD A DRINK FIRST THING IN THE MORNING TO STEADY YOUR NERVES TO GET RID OF A HANGOVER: NO
HAVE PEOPLE ANNOYED YOU BY CRITICIZING YOUR DRINKING: NO
EVER FELT BAD OR GUILTY ABOUT YOUR DRINKING: NO
HAVE YOU EVER FELT YOU SHOULD CUT DOWN ON YOUR DRINKING: NO
TOTAL SCORE: 0

## 2025-04-03 ASSESSMENT — PAIN DESCRIPTION - LOCATION: LOCATION: PELVIS

## 2025-04-03 ASSESSMENT — PAIN DESCRIPTION - ORIENTATION: ORIENTATION: RIGHT

## 2025-04-03 ASSESSMENT — PAIN SCALES - GENERAL: PAINLEVEL_OUTOF10: 10 - WORST POSSIBLE PAIN

## 2025-04-03 ASSESSMENT — PAIN - FUNCTIONAL ASSESSMENT: PAIN_FUNCTIONAL_ASSESSMENT: 0-10

## 2025-04-03 ASSESSMENT — PAIN DESCRIPTION - PAIN TYPE: TYPE: ACUTE PAIN

## 2025-04-03 NOTE — ED PROVIDER NOTES
EMERGENCY DEPARTMENT ENCOUNTER      Pt Name: Sammi Miranda  MRN: [id number]  Birthdate 1975  Date of evaluation: 4/3/2025  Provider: SIDDHARTHA Ferreira-CNP    CHIEF COMPLAINT       Chief Complaint   Patient presents with    Pelvic Pain     Intermittent right sided pelvic pain x few months. Worsening over the past few weeks. Had a similar episode a year ago but it went away. Pain unrelieved by OTC medications. Sent in by Dr. Gould for CT scan.       HISTORY OF PRESENT ILLNESS    Sammi Miranda is a 49 y.o. female who presents to the emergency department as directed by her primary care provider for evaluation of right lower quadrant/suprapubic abdominal/pelvic pain onset a few months ago which has acutely worsened last couple weeks.  P states that she had a similar episode 1 year ago but it went away.  She has taken over-the-counter medications with improved but not relief of symptoms.  She states she was sent in for a CT scan.  She has had a history of tubal ligation bilaterally and appendectomy.  She does state that she still has her ovarian cyst.  States that the pain is intermittent.  She denies any trauma, fall, injury or anticoagulation use.  Denies any fevers, chills, urinary symptoms, chance for STD, vaginal discharge, vaginal bleeding, nausea, vomiting, chance for pregnancy or any additional symptoms or complaints at this time.    Nursing Notes were reviewed.    History provided by patient.  No  used.    REVIEW OF SYSTEMS     ROS is otherwise negative unless stated above.    PAST MEDICAL HISTORY     Past Medical History:   Diagnosis Date    Abnormal uterine bleeding (AUB)     Anxiety     Hidradenitis suppurativa     Hyperlipidemia     Hypothyroidism     Hypothyroidism due to Hashimoto's thyroiditis    Pain in female pelvis     Pelvic pain     Vulvar lesion     Vulval hidradenitis suppurativa       SURGICAL HISTORY       Past Surgical History:   Procedure Laterality Date     APPENDECTOMY       SECTION, CLASSIC      DENTAL IMPLANT      HYSTEROSCOPY      D&C, endometrial ablation    SKIN LESION EXCISION  2024    FEMALE GENITALIA       ALLERGIES     Patient has no known allergies.    FAMILY HISTORY     No family history on file.     SOCIAL HISTORY       Social History     Socioeconomic History    Marital status: Single   Tobacco Use    Smoking status: Every Day     Current packs/day: 0.50     Average packs/day: 0.5 packs/day for 15.0 years (7.5 ttl pk-yrs)     Types: Cigarettes    Smokeless tobacco: Never   Vaping Use    Vaping status: Never Used   Substance and Sexual Activity    Alcohol use: Yes     Comment: occasionally    Drug use: Yes     Types: Marijuana     Comment: smokes daily     Social Drivers of Health     Financial Resource Strain: Low Risk  (2024)    Received from [facility]    Overall Financial Resource Strain (CARDIA)     Difficulty of Paying Living Expenses: Not very hard   Food Insecurity: No Food Insecurity (2024)    Received from [facility]    Hunger Vital Sign     Worried About Running Out of Food in the Last Year: Never true     Ran Out of Food in the Last Year: Never true   Transportation Needs: No Transportation Needs (2024)    Received from [facility]    PRAPARE - Transportation     Lack of Transportation (Medical): No     Lack of Transportation (Non-Medical): No   Physical Activity: Insufficiently Active (2020)    Received from [facility]    Exercise Vital Sign     Days of Exercise per Week: 1 day     Minutes of Exercise per Session: 20 min   Stress: Stress Concern Present (2024)    Received from [facility]    [employer] - Occupational Stress Questionnaire     Feeling of Stress : Rather much   Social Connections: Socially Isolated (2024)    Received from [facility]  [facility]    Social Connection and Isolation Panel [NHANES]     Frequency of Communication with Friends and Family: More than three times a week     Frequency of Social Gatherings with Friends and Family: Twice a week     Attends [employer] Services: Never     Active Member of Clubs or Organizations: No     Attends Club or Organization Meetings: Never     Marital Status:    Housing Stability: Low Risk  (2/5/2024)    Received from [facility], [facility]    Housing Stability Vital Sign     Unable to Pay for Housing in the Last Year: No     Number of Places Lived in the Last Year: 1     Unstable Housing in the Last Year: No       PHYSICAL EXAM   VS: As documented in the triage note and EMR flowsheet from this visit were reviewed.    GEN: NAD, nontoxic, well-appearing, ambulates without difficulty  EYES: PERRLA  HEENT: Airway patent  CARD: RRR, nontender chest, no crepitus deformities, no JVD, no murmurs rubs or gallops ; No edema noted.  Positive pulses bilaterally throughout.  Capillary refill less than 3 seconds.  No abnormal redness, warmth, tenderness or swelling noted to bilateral lower extremities.  PULMONARY: Clear all lung fields. Moving air well, Nonlabored, no accessory muscle use, able to speak complete sentences  ABDOMEN: Abdomen soft, non-distended, no rebound, no guarding. Bowel sounds normal in all 4 quadrants.  Right suprapubic/lower quadrant tenderness to palpation.  No CVA tenderness.  No masses or organomegaly noted.  No evidence of peritonitis. Negative Guo's and McBurney's point tenderness.    : deferred  MUSK: Spine appears normal, range of motion is not limited, no muscle or joint tenderness. Strength 5 out of 5 equal bilaterally throughout.  No step-offs, deformities or additional signs of trauma noted.  No spinal/midline tenderness to palpation  SKIN: Skin normal color for race, warm, dry and intact. No evidence of trauma. No rash noted.  NEURO: Alert and oriented  x 3, speech is clear, no obvious deficits noted.  GCS 15  PSYCH: Alert and oriented to person, place, time/situation. normal mood and affect. No apparent risk to self or others. Thoughts are linear.  Does not appear decompensated.  Does not appear internally stimulated.  LYMPH: No adenopathy or splenomegaly. No cervical, supraclavicular or inguinal lymphadenopathy.    DIAGNOSTIC RESULTS   RADIOLOGY:   Non-plain film images such as CT, Ultrasound and MRI are read by the radiologist. Plain radiographic images are visualized and preliminarily interpreted by myself with the below findings: None      Interpretation per the Radiologist below, if available at the time of this note:    US PELVIS TRANSABDOMINAL WITH TRANSVAGINAL   Final Result   1. Heterogeneous uterus without discrete fibroids.   2. Endometrial stripe mildly thickened for a postmenopausal patient.   Clinical correlation is recommended.   3. Unremarkable ultrasound appearance of the ovaries.        MACRO:   None        Signed by: Chavo Urbina 4/3/2025 3:10 PM   Dictation workstation:   [id number]      CT abdomen pelvis w IV contrast   Final Result   The appendix is not identified but there is no evidence of   appendicitis.   Scattered diverticula.   No acute process.   Signed by Teja Pagan MD            ED BEDSIDE ULTRASOUND:   Performed by myself - none    LABS:  Labs Reviewed   URINALYSIS WITH REFLEX CULTURE AND MICROSCOPIC - Abnormal       Result Value    Color, Urine Light-Yellow      Appearance, Urine Turbid (*)     Specific Gravity, Urine 1.022      pH, Urine 5.5      Protein, Urine NEGATIVE      Glucose, Urine Normal      Blood, Urine 0.03 (TRACE) (*)     Ketones, Urine NEGATIVE      Bilirubin, Urine NEGATIVE      Urobilinogen, Urine Normal      Nitrite, Urine NEGATIVE      Leukocyte Esterase, Urine NEGATIVE     MAGNESIUM - Normal    Magnesium 2.03     COMPREHENSIVE METABOLIC PANEL - Normal    Glucose 88      Sodium 136      Potassium 4.2       Chloride 103      Bicarbonate 25      Anion Gap 12      Urea Nitrogen 14      Creatinine 0.66      eGFR >90      Calcium 9.2      Albumin 4.5      Alkaline Phosphatase 61      Total Protein 7.0      AST 17      Bilirubin, Total 0.3      ALT 13     LIPASE - Normal    Lipase 43      Narrative:     Venipuncture immediately after or during the administration of Metamizole may lead to falsely low results. Testing should be performed immediately prior to Metamizole dosing.   HUMAN CHORIONIC GONADOTROPIN, SERUM QUANTITATIVE - Normal    HCG, Beta-Quantitative <2      Narrative:      Total HCG measurement is performed using the [employer] Access   Immunoassay which detects intact HCG and free beta HCG subunit.    This test is not indicated for use as a tumor marker.   HCG testing is performed using a different test methodology at [facility] than other [facility]. Direct result comparison   should only be made within the same method.       CBC WITH AUTO DIFFERENTIAL    WBC 10.2      nRBC 0.0      RBC 4.56      Hemoglobin 13.7      Hematocrit 40.9      MCV 90      MCH 30.0      MCHC 33.5      RDW 13.2      Platelets 368      Neutrophils % 63.8      Immature Granulocytes %, Automated 0.5      Lymphocytes % 26.6      Monocytes % 6.9      Eosinophils % 1.5      Basophils % 0.7      Neutrophils Absolute 6.49      Immature Granulocytes Absolute, Automated 0.05      Lymphocytes Absolute 2.71      Monocytes Absolute 0.70      Eosinophils Absolute 0.15      Basophils Absolute 0.07     URINALYSIS WITH REFLEX CULTURE AND MICROSCOPIC    Narrative:     The following orders were created for panel order Urinalysis with Reflex Culture and Microscopic.  Procedure                               Abnormality         Status                     ---------                               -----------         ------                     Urinalysis with Reflex C...[[id number]]  Abnormal            Final result               Extra  "Urine Gray Tube[[id number]]                            In process                   Please view results for these tests on the individual orders.   EXTRA URINE GRAY TUBE   URINALYSIS MICROSCOPIC WITH REFLEX CULTURE    WBC, Urine NONE      RBC, Urine 3-5      Squamous Epithelial Cells, Urine 10-25 (FEW)      Mucus, Urine FEW         All other labs were within normal range or not returned as of this dictation.    EMERGENCY DEPARTMENT COURSE/MDM:   Vitals:    Vitals:    04/03/25 1319 04/03/25 1620   BP: 130/54 142/78   BP Location: Left arm Right arm   Patient Position: Sitting Sitting   Pulse: 76 85   Resp: 18 20   Temp: 36.1 °C (97 °F)    TempSrc: Temporal    SpO2: 98% 100%   Weight: 79.4 kg (175 lb)    Height: 1.676 m (5' 6\")        I reviewed the patient's triage vitals.    This is a 49-year-old female presents to the ED as directed by her primary care provider for evaluation of right lower quadrant abdominal pain.  She is ambulatory at her baseline.  She has no peritonitis on examination.  She declined need for medications at this time.  She additionally declined a pelvic exam.    Workup was reviewed and unremarkable for any emergent findings. There is no evidence of torsion, she does have a mildly thickened endometrial stripe.  I did update Dr. Gould of these findings.  Upon reevaluation patient is agitated and verbally aggressive with myself and staff regarding \"I knew it was a waste my time coming to the emergency department and you guys just want my money and drive nice cars.  \"I educated patient that she will likely need workup that cannot be performed in the emergency department including an MRI of her pelvis if her symptoms persist and she states \"I am going to freak out.  \"I did educate her on pain control and symptomatic supportive care at home.  I educated her that she cannot yell at staff and she proceeded to leave the emergency department.  She remained hemodynamically stable throughout my ED course of " care.    Diagnoses as of 04/03/25 1651   Right lower quadrant pain       Patient was counseled regarding labs, imaging, likely diagnosis, and plan. All questions were answered.     ------------------------------------------------------------------  EKG Interpretation: N/A    Differential Diagnoses Considered: musculoskeletal pain, ovarian cyst, ovarian torsion, UTI, pyelonephritis, kidney stone, ectopic pregnancy, acute intra-abdominal process, acute on chronic pain    Chronic Medical Conditions Significantly Affecting Care: none    External Records Reviewed: I reviewed recent and relevant outside records including: PCP notes, prior discharge summary, previous radiologic studies, HIE    Escalation of Care:  Appropriate for outpatient management with primary care provider follow-up and OB/GYN follow-up in the next week for reevaluation and long-term management.  Return precautions discussed and patient verbalized understanding. All questions and concerns were addressed.    Social Determinants of Health Significantly Affecting Care:  None    Prescription Drug Consideration: Norco p.o. for severe pain management home for couple days after reviewing her OARRS score.  Educated not to take this with her Ativan at the same time    Diagnostic testing considered: No additional indicated this time    Discussion of Management with Other Providers:   I discussed the patient/results with: None    ------------------------------------------------------------------  ED Medications administered this visit:    Medications   iohexol (OMNIPaque) 350 mg iodine/mL solution 75 mL (75 mL intravenous Given 4/3/25 4131)       New Prescriptions from this visit:    Discharge Medication List as of 4/3/2025  4:12 PM        START taking these medications    Details   HYDROcodone-acetaminophen (Norco) 5-325 mg tablet Take 1 tablet by mouth every 6 hours if needed for severe pain (7 - 10) for up to 3 days., Starting Thu 4/3/2025, Until Sun 4/6/2025  at 2359, Normal             Follow-up:  Christian Blackburn MD  [address] [facility]  [place] [address]  [phone]    Schedule an appointment as soon as possible for a visit in 3 days  reevaluation, If symptoms worsen    OBGYN    Schedule an appointment as soon as possible for a visit in 3 days  reevaluation, If symptoms worsen        Final Impression:   1. Right lower quadrant pain          SIDDHARTHA Ferreira-CNP        (Please note that portions of this note were completed with a voice recognition program.  Efforts were made to edit the dictations but occasionally words are mis-transcribed.)     MOSHE Ferreira  04/03/25 2244

## 2025-04-03 NOTE — Clinical Note
Sammi Miranda was seen and treated in our emergency department on 4/3/2025.  She may return to work on 04/05/2025.       If you have any questions or concerns, please don't hesitate to call.      Leanne Archuleta, SIDDHARTHA-CNP

## 2025-04-04 LAB — HOLD SPECIMEN: NORMAL

## 2025-04-06 ASSESSMENT — ENCOUNTER SYMPTOMS
ARTHRALGIAS: 1
ABDOMINAL PAIN: 1

## 2025-04-08 ENCOUNTER — APPOINTMENT (OUTPATIENT)
Dept: OBSTETRICS AND GYNECOLOGY | Facility: CLINIC | Age: 50
End: 2025-04-08
Payer: COMMERCIAL

## 2025-04-08 VITALS
DIASTOLIC BLOOD PRESSURE: 82 MMHG | BODY MASS INDEX: 28.12 KG/M2 | WEIGHT: 175 LBS | SYSTOLIC BLOOD PRESSURE: 124 MMHG | HEIGHT: 66 IN

## 2025-04-08 DIAGNOSIS — R10.2 PAIN IN FEMALE PELVIS: Primary | ICD-10-CM

## 2025-04-08 PROCEDURE — 3008F BODY MASS INDEX DOCD: CPT | Performed by: OBSTETRICS & GYNECOLOGY

## 2025-04-08 PROCEDURE — 99214 OFFICE O/P EST MOD 30 MIN: CPT | Performed by: OBSTETRICS & GYNECOLOGY

## 2025-04-08 ASSESSMENT — ENCOUNTER SYMPTOMS
ARTHRALGIAS: 1
ABDOMINAL PAIN: 1

## 2025-04-08 NOTE — PROGRESS NOTES
Subjective   Patient ID: Sammi Miranda is a 49 y.o. female who presents for followup er.  Review of recent emergency room note and findings, was discharged home on hydrocodone.  States she still has 4 pills left.  It did not help her pain    ED  Discharged  4/3/2025 (2 hours)  [facility] Emergency Medicine       Treatment team  Providers Right lower quadrant pain  Clinical impression Pelvic Pain   Chief complaint    ED Provider Notes  MOSHE Ferreira (Nurse Practitioner) · Emergency Medicine  Expand All Collapse All  EMERGENCY DEPARTMENT ENCOUNTER       Pt Name: Sammi Miranda  MRN: [id number]  Birthdate 1975  Date of evaluation: 4/3/2025  Provider: MOSHE Ferreira       CHIEF COMPLAINT         Chief Complaint  Patient presents with  · Pelvic Pain      Intermittent right sided pelvic pain x few months. Worsening over the past few weeks. Had a similar episode a year ago but it went away. Pain unrelieved by OTC medications. Sent in by Dr. Gould for CT scan.          HISTORY OF PRESENT ILLNESS   Sammi Miranda is a 49 y.o. female who presents to the emergency department as directed by her primary care provider for evaluation of right lower quadrant/suprapubic abdominal/pelvic pain onset a few months ago which has acutely worsened last couple weeks.  P states that she had a similar episode 1 year ago but it went away.  She has taken over-the-counter medications with improved but not relief of symptoms.  She states she was sent in for a CT scan.  She has had a history of tubal ligation bilaterally and appendectomy.  She does state that she still has her ovarian cyst.  States that the pain is intermittent.  She denies any trauma, fall, injury or anticoagulation use.  Denies any fevers, chills, urinary symptoms, chance for STD, vaginal discharge, vaginal bleeding, nausea, vomiting, chance for pregnancy or any additional symptoms or complaints at this time.     Nursing Notes were  reviewed.     History provided by patient.  No  used.       REVIEW OF SYSTEMS    ROS is otherwise negative unless stated above.       PAST MEDICAL HISTORY       Medical History  Past Medical History:  Diagnosis Date  · Abnormal uterine bleeding (AUB)    · Anxiety    · Hidradenitis suppurativa    · Hyperlipidemia    · Hypothyroidism      Hypothyroidism due to Hashimoto's thyroiditis  · Pain in female pelvis    · Pelvic pain    · Vulvar lesion      Vulval hidradenitis suppurativa            SURGICAL HISTORY         Surgical History  Past Surgical History:  Procedure Laterality Date  · APPENDECTOMY      ·  SECTION, CLASSIC      · DENTAL IMPLANT      · HYSTEROSCOPY        D&C, endometrial ablation  · SKIN LESION EXCISION   2024    FEMALE GENITALIA            ALLERGIES    Patient has no known allergies.       FAMILY HISTORY    Family History  No family history on file.         SOCIAL HISTORY         Social History  Social History       Socioeconomic History  · Marital status: Single  Tobacco Use  · Smoking status: Every Day      Current packs/day: 0.50      Average packs/day: 0.5 packs/day for 15.0 years (7.5 ttl pk-yrs)      Types: Cigarettes  · Smokeless tobacco: Never  Vaping Use  · Vaping status: Never Used  Substance and Sexual Activity  · Alcohol use: Yes      Comment: occasionally  · Drug use: Yes      Types: Marijuana      Comment: smokes daily       Social Drivers of Health       Financial Resource Strain: Low Risk  (2024)    Received from [facility]    Overall Financial Resource Strain (CARDIA)    · Difficulty of Paying Living Expenses: Not very hard  Food Insecurity: No Food Insecurity (2024)    Received from [facility]    Hunger Vital Sign    · Worried About Running Out of Food in the Last Year: Never true    · Ran Out of Food in the Last Year: Never true  Transportation Needs: No Transportation Needs (2024)     Received from [facility]    PRAPARE - Transportation    · Lack of Transportation (Medical): No    · Lack of Transportation (Non-Medical): No  Physical Activity: Insufficiently Active (9/21/2020)    Received from [facility]    Exercise Vital Sign    · Days of Exercise per Week: 1 day    · Minutes of Exercise per Session: 20 min  Stress: Stress Concern Present (2/5/2024)    Received from [facility]    [place] [employer] - Occupational Stress Questionnaire    · Feeling of Stress : Rather much  Social Connections: Socially Isolated (2/5/2024)    Received from [facility]    Social Connection and Isolation Panel [NHANES]    · Frequency of Communication with Friends and Family: More than three times a week    · Frequency of Social Gatherings with Friends and Family: Twice a week    · Attends Denominational Services: Never    · Active Member of Clubs or Organizations: No    · Attends Club or Organization Meetings: Never    · Marital Status:   Housing Stability: Low Risk  (2/5/2024)    Received from [facility]    Housing Stability Vital Sign    · Unable to Pay for Housing in the Last Year: No    · Number of Places Lived in the Last Year: 1    · Unstable Housing in the Last Year: No            PHYSICAL EXAM  VS: As documented in the triage note and EMR flowsheet from this visit were reviewed.     GEN: NAD, nontoxic, well-appearing, ambulates without difficulty  EYES: PERRLA  HEENT: Airway patent  CARD: RRR, nontender chest, no crepitus deformities, no JVD, no murmurs rubs or gallops ; No edema noted.  Positive pulses bilaterally throughout.  Capillary refill less than 3 seconds.  No abnormal redness, warmth, tenderness or swelling noted to bilateral lower extremities.  PULMONARY: Clear all lung fields. Moving air well, Nonlabored, no accessory muscle use, able to speak complete  sentences  ABDOMEN: Abdomen soft, non-distended, no rebound, no guarding. Bowel sounds normal in all 4 quadrants.  Right suprapubic/lower quadrant tenderness to palpation.  No CVA tenderness.  No masses or organomegaly noted.  No evidence of peritonitis. Negative Guo's and McBurney's point tenderness.    : deferred  MUSK: Spine appears normal, range of motion is not limited, no muscle or joint tenderness. Strength 5 out of 5 equal bilaterally throughout.  No step-offs, deformities or additional signs of trauma noted.  No spinal/midline tenderness to palpation  SKIN: Skin normal color for race, warm, dry and intact. No evidence of trauma. No rash noted.  NEURO: Alert and oriented x 3, speech is clear, no obvious deficits noted.  GCS 15  PSYCH: Alert and oriented to person, place, time/situation. normal mood and affect. No apparent risk to self or others. Thoughts are linear.  Does not appear decompensated.  Does not appear internally stimulated.  LYMPH: No adenopathy or splenomegaly. No cervical, supraclavicular or inguinal lymphadenopathy.       DIAGNOSTIC RESULTS  RADIOLOGY:   Non-plain film images such as CT, Ultrasound and MRI are read by the radiologist. Plain radiographic images are visualized and preliminarily interpreted by myself with the below findings: None        Interpretation per the Radiologist below, if available at the time of this note:       US PELVIS TRANSABDOMINAL WITH TRANSVAGINAL  Final Result  1. Heterogeneous uterus without discrete fibroids.  2. Endometrial stripe mildly thickened for a postmenopausal patient.  Clinical correlation is recommended.  3. Unremarkable ultrasound appearance of the ovaries.      MACRO:  None      Signed by: Chavo Urbina 4/3/2025 3:10 PM  Dictation workstation:   [id number]     CT abdomen pelvis w IV contrast  Final Result  The appendix is not identified but there is no evidence of  appendicitis.  Scattered diverticula.  No acute process.  Signed by Teja  MD Ej              ED BEDSIDE ULTRASOUND:   Performed by myself - none     LABS:    Labs Reviewed  URINALYSIS WITH REFLEX CULTURE AND MICROSCOPIC - Abnormal      Result Value     Color, Urine Light-Yellow       Appearance, Urine Turbid (*)      Specific Gravity, Urine 1.022       pH, Urine 5.5       Protein, Urine NEGATIVE       Glucose, Urine Normal       Blood, Urine 0.03 (TRACE) (*)      Ketones, Urine NEGATIVE       Bilirubin, Urine NEGATIVE       Urobilinogen, Urine Normal       Nitrite, Urine NEGATIVE       Leukocyte Esterase, Urine NEGATIVE     MAGNESIUM - Normal    Magnesium 2.03     COMPREHENSIVE METABOLIC PANEL - Normal    Glucose 88       Sodium 136       Potassium 4.2       Chloride 103       Bicarbonate 25       Anion Gap 12       Urea Nitrogen 14       Creatinine 0.66       eGFR >90       Calcium 9.2       Albumin 4.5       Alkaline Phosphatase 61       Total Protein 7.0       AST 17       Bilirubin, Total 0.3       ALT 13     LIPASE - Normal    Lipase 43       Narrative:     Venipuncture immediately after or during the administration of Metamizole may lead to falsely low results. Testing should be performed immediately prior to Metamizole dosing.  HUMAN CHORIONIC GONADOTROPIN, SERUM QUANTITATIVE - Normal    HCG, Beta-Quantitative <2       Narrative:      Total HCG measurement is performed using the [employer] Access   Immunoassay which detects intact HCG and free beta HCG subunit.    This test is not indicated for use as a tumor marker.   HCG testing is performed using a different test methodology at [facility] than other [facility]. Direct result comparison   should only be made within the same method.      CBC WITH AUTO DIFFERENTIAL    WBC 10.2       nRBC 0.0       RBC 4.56       Hemoglobin 13.7       Hematocrit 40.9       MCV 90       MCH 30.0       MCHC 33.5       RDW 13.2       Platelets 368       Neutrophils % 63.8       Immature Granulocytes %, Automated 0.5       " Lymphocytes % 26.6       Monocytes % 6.9       Eosinophils % 1.5       Basophils % 0.7       Neutrophils Absolute 6.49       Immature Granulocytes Absolute, Automated 0.05       Lymphocytes Absolute 2.71       Monocytes Absolute 0.70       Eosinophils Absolute 0.15       Basophils Absolute 0.07     URINALYSIS WITH REFLEX CULTURE AND MICROSCOPIC    Narrative:     The following orders were created for panel order Urinalysis with Reflex Culture and Microscopic.  Procedure                               Abnormality         Status                     ---------                               -----------         ------                     Urinalysis with Reflex C...[[id number]]  Abnormal            Final result               Extra Urine Gray Tube[[id number]]                            In process                    Please view results for these tests on the individual orders.  EXTRA URINE GRAY TUBE  URINALYSIS MICROSCOPIC WITH REFLEX CULTURE    WBC, Urine NONE       RBC, Urine 3-5       Squamous Epithelial Cells, Urine 10-25 (FEW)       Mucus, Urine FEW           All other labs were within normal range or not returned as of this dictation.       EMERGENCY DEPARTMENT COURSE/MDM:  Vitals:      Vitals  Vitals:    04/03/25 1319 04/03/25 1620  BP: 130/54 142/78  BP Location: Left arm Right arm  Patient Position: Sitting Sitting  Pulse: 76 85  Resp: 18 20  Temp: 36.1 °C (97 °F)    TempSrc: Temporal    SpO2: 98% 100%  Weight: 79.4 kg (175 lb)    Height: 1.676 m (5' 6\")            I reviewed the patient's triage vitals.     This is a 49-year-old female presents to the ED as directed by her primary care provider for evaluation of right lower quadrant abdominal pain.  She is ambulatory at her baseline.  She has no peritonitis on examination.  She declined need for medications at this time.  She additionally declined a pelvic exam.     Workup was reviewed and unremarkable for any emergent findings. There is no evidence of torsion, she " "does have a mildly thickened endometrial stripe.  I did update Dr. Gould of these findings.  Upon reevaluation patient is agitated and verbally aggressive with myself and staff regarding \"I knew it was a waste my time coming to the emergency department and you guys just want my money and drive nice cars.  \"I educated patient that she will likely need workup that cannot be performed in the emergency department including an MRI of her pelvis if her symptoms persist and she states \"I am going to freak out.  \"I did educate her on pain control and symptomatic supportive care at home.  I educated her that she cannot yell at staff and she proceeded to leave the emergency department.  She remained hemodynamically stable throughout my ED course of care.       Diagnoses as of 04/03/25 1651  Right lower quadrant pain        Patient was counseled regarding labs, imaging, likely diagnosis, and plan. All questions were answered.      ------------------------------------------------------------------  EKG Interpretation: N/A     Differential Diagnoses Considered: musculoskeletal pain, ovarian cyst, ovarian torsion, UTI, pyelonephritis, kidney stone, ectopic pregnancy, acute intra-abdominal process, acute on chronic pain     Chronic Medical Conditions Significantly Affecting Care: none     External Records Reviewed: I reviewed recent and relevant outside records including: PCP notes, prior discharge summary, previous radiologic studies, HIE     Escalation of Care:  Appropriate for outpatient management with primary care provider follow-up and OB/GYN follow-up in the next week for reevaluation and long-term management.  Return precautions discussed and patient verbalized understanding. All questions and concerns were addressed.     Social Determinants of Health Significantly Affecting Care:  None     Prescription Drug Consideration: Norco p.o. for severe pain management home for couple days after reviewing her OARRS score.  Educated " not to take this with her Ativan at the same time     Diagnostic testing considered: No additional indicated this time     Discussion of Management with Other Providers:   I discussed the patient/results with: None     ------------------------------------------------------------------  ED Medications administered this visit:      Medications  iohexol (OMNIPaque) 350 mg iodine/mL solution 75 mL (75 mL intravenous Given 4/3/25 1421)        New Prescriptions from this visit:      Discharge Medication List as of 4/3/2025  4:12 PM          START taking these medications    Details  HYDROcodone-acetaminophen (Norco) 5-325 mg tablet Take 1 tablet by mouth every 6 hours if needed for severe pain (7 - 10) for up to 3 days., Starting Thu 4/3/2025, Until Sun 4/6/2025 at 2359, Normal              Follow-up:  Christian Blackburn MD  [id number] [id number] [phone]     Schedule an appointment as soon as possible for a visit in 3 days  reevaluation, If symptoms worsen     OBGYN     Schedule an appointment as soon as possible for a visit in 3 days  reevaluation, If symptoms worsen        Final Impression:     1. Right lower quadrant pain          MOSHE Ferreira           (Please note that portions of this note were completed with a voice recognition program.  Efforts were made to edit the dictations but occasionally words are mis-transcribed.)     MOSHE Ferreira  04/03/25 1651      Ultrasound reviewed,  US PELVIS TRANSABDOMINAL WITH TRANSVAGINAL  Status: Final result    Study Result    Narrative & Impression  Interpreted By:  Chavo Urbina,   STUDY:  US PELVIS TRANSABDOMINAL WITH TRANSVAGINAL; ;  4/3/2025 2:58 pm      INDICATION:  Signs/Symptoms:RLQ pain.      COMPARISON:  None.      ACCESSION NUMBER(S):  [id number]      ORDERING CLINICIAN:  NATALIE TRAORE      TECHNIQUE:  Multiple multiplanar static gray scale, color and spectral waveform  sonographic images of the pelvis were  obtained. Transabdominal and  endovaginal ultrasound was performed.      FINDINGS:  UTERUS:  The uterus is diffusely heterogeneous in appearance, without discrete  fibroids.      The uterus measures at 8.2 cm in length and 4.6 x 5.7 cm in AP and  transverse diameters.      ENDOMETRIUM:  The endometrium measures a thickness of 7 mm, which is mildly  thickened for a postmenopausal patient.      RIGHT ADNEXA:  The right ovary measures at 2.4 x 2.1 x 2.6 cm.      Normal-appearing color Doppler flow is seen in the right ovary.      No right adnexal mass is identified.      LEFT ADNEXA:  The left ovary measures at 4.0 x 1.6 x 2.9 cm.      Normal-appearing color Doppler flow is seen in the left ovary.      No left adnexal masses identified.      CUL DE SAC:  No free fluid is identified.      IMPRESSION:  1. Heterogeneous uterus without discrete fibroids.  2. Endometrial stripe mildly thickened for a postmenopausal patient.  Clinical correlation is recommended.  3. Unremarkable ultrasound appearance of the ovaries.      MACRO:  None      Signed by: Chavo Urbina 4/3/2025 3:10 PM  Dictation workstation:   [id number]    As of yesterday is not having pain any further.  Reviewed that they did not see any cyst but she had symptoms this past weekend like a cyst ruptured.  We do not know the source of her pain.  At this point she is not having further pain so I do not recommend any surgical intervention.  Did discuss that we could consider low-dose oral contraceptives if she did not smoke but she prefers not to take that she does not need to.  At this time our recommendation would be to monitor the pain if it recurs then we would recommend laparoscopy    Abdominal Pain  This is a chronic problem. The current episode started more than 1 year ago. The onset quality is gradual. The problem occurs 2 to 4 times per day. The most recent episode lasted 2 days. The problem has been waxing and waning. The pain is located in the right  flank. The pain is at a severity of 10/10. The quality of the pain is cramping. The abdominal pain does not radiate. Associated symptoms include arthralgias. Nothing aggravates the pain. The pain is relieved by Nothing. Prior diagnostic workup includes CT scan and ultrasound.       Review of Systems   Gastrointestinal:  Positive for abdominal pain.   Genitourinary:  Positive for pelvic pain.   Musculoskeletal:  Positive for arthralgias.       Objective   Physical Exam  Constitutional:       Appearance: Normal appearance. She is normal weight.   Neurological:      Mental Status: She is alert.         Assessment/Plan   Has 1 year history of off-and-on pain.  Had a normal laparoscopy last summer.  Recent emergency room visit ultrasound CT scan imaging were all normal.  Her pain is now gotten better.  Her main worry is what this pain comes back.  I am reassuring her that her imaging is normal we do not find anything worrisome and that her pain is resolved         Franklyn Gould MD 04/08/25 2:18 PM

## 2025-05-02 ENCOUNTER — APPOINTMENT (OUTPATIENT)
Dept: OBSTETRICS AND GYNECOLOGY | Facility: CLINIC | Age: 50
End: 2025-05-02
Payer: COMMERCIAL

## 2025-05-02 VITALS
HEIGHT: 66 IN | BODY MASS INDEX: 27.64 KG/M2 | SYSTOLIC BLOOD PRESSURE: 122 MMHG | WEIGHT: 172 LBS | DIASTOLIC BLOOD PRESSURE: 82 MMHG

## 2025-05-02 DIAGNOSIS — Z01.419 WELL WOMAN EXAM: Primary | ICD-10-CM

## 2025-05-02 PROCEDURE — 3008F BODY MASS INDEX DOCD: CPT | Performed by: OBSTETRICS & GYNECOLOGY

## 2025-05-02 PROCEDURE — 99396 PREV VISIT EST AGE 40-64: CPT | Performed by: OBSTETRICS & GYNECOLOGY

## 2025-05-02 ASSESSMENT — ENCOUNTER SYMPTOMS
DYSURIA: 0
ARTHRALGIAS: 1
FREQUENCY: 0
CONSTIPATION: 0
WEIGHT LOSS: 0
HEMATURIA: 0
HEMATOCHEZIA: 0
FLATUS: 0
ANOREXIA: 0
BELCHING: 0
VOMITING: 0
DIARRHEA: 0
HEADACHES: 0
FEVER: 0
NAUSEA: 0
ABDOMINAL PAIN: 1
MYALGIAS: 1

## 2025-05-02 NOTE — PROGRESS NOTES
Subjective   Patient ID: Sammi Miranda is a 49 y.o. female who presents for well woman visit.  Established patient for annual exam.  49 years old.  Prior endometrial ablation.  No menses for almost 2 years now.  Has been in the emergency room for pain recently had similar pain last week rating it 6 out of 10 now 3-5    Meds include L-thyroxine and lorazepam as needed    Family history grandmother breast cancer she is single.  Nupathe last Pap 2023 was atypical cells negative HPV NuPrep on exam breast abdominal pelvic exams normal.  Reviewed pelvic ultrasound which was normal.  Next Pap should be in 2026.  This laparoscopy has been negative.  She not having any other associated symptoms.    Review of recent ultrasound,  US PELVIS TRANSABDOMINAL WITH TRANSVAGINAL  Status: Final result    Study Result    Narrative & Impression  Interpreted By:  Chavo Urbina,   STUDY:  US PELVIS TRANSABDOMINAL WITH TRANSVAGINAL; ;  4/3/2025 2:58 pm      INDICATION:  Signs/Symptoms:RLQ pain.      COMPARISON:  None.      ACCESSION NUMBER(S):  [id number]      ORDERING CLINICIAN:  NATALIE TRAORE      TECHNIQUE:  Multiple multiplanar static gray scale, color and spectral waveform  sonographic images of the pelvis were obtained. Transabdominal and  endovaginal ultrasound was performed.      FINDINGS:  UTERUS:  The uterus is diffusely heterogeneous in appearance, without discrete  fibroids.      The uterus measures at 8.2 cm in length and 4.6 x 5.7 cm in AP and  transverse diameters.      ENDOMETRIUM:  The endometrium measures a thickness of 7 mm, which is mildly  thickened for a postmenopausal patient.      RIGHT ADNEXA:  The right ovary measures at 2.4 x 2.1 x 2.6 cm.      Normal-appearing color Doppler flow is seen in the right ovary.      No right adnexal mass is identified.      LEFT ADNEXA:  The left ovary measures at 4.0 x 1.6 x 2.9 cm.      Normal-appearing color Doppler flow is seen in the left ovary.      No left adnexal masses  identified.      CUL DE SAC:  No free fluid is identified.      IMPRESSION:  1. Heterogeneous uterus without discrete fibroids.  2. Endometrial stripe mildly thickened for a postmenopausal patient.  Clinical correlation is recommended.  3. Unremarkable ultrasound appearance of the ovaries.      MACRO:  None      Signed by: Chavo Urbina 4/3/2025 3:10 PM  Dictation workstation:   [id number]    Review of recent CT scan,  CT abdomen pelvis w IV contrast  Status: Final result    Study Result    Narrative & Impression  STUDY:  CT Abdomen and Pelvis with IV Contrast; 04/03/2025, 2:24 PM.  INDICATION:  Right lower quadrant abdominal pain.  COMPARISON:  Not available.  ACCESSION NUMBER(S):  [id number]  ORDERING CLINICIAN:  NATALIE TRAORE  TECHNIQUE:  CT of the abdomen and pelvis was performed.  Contiguous axial images  were obtained at 3 mm slice thickness through the abdomen and pelvis.   Coronal and sagittal reconstructions at 3 mm slice thickness were  performed.  Omnipaque 350 75 mL was administered intravenously.    FINDINGS:  LOWER CHEST:  No cardiomegaly.  No pericardial effusion.  Lung bases are clear.     ABDOMEN:     LIVER:  No hepatomegaly.  Smooth surface contour.  Normal attenuation.     BILE DUCTS:  No intrahepatic or extrahepatic biliary ductal dilatation.     GALLBLADDER:  The gallbladder is contracted.  STOMACH:  No abnormalities identified.     PANCREAS:  No masses or ductal dilatation.     SPLEEN:  No splenomegaly or focal splenic lesion.     ADRENAL GLANDS:  No thickening or nodules.     KIDNEYS AND URETERS:  Kidneys are normal in size and location.  No renal or ureteral  calculi.     PELVIS:     BLADDER:  No abnormalities identified.     REPRODUCTIVE ORGANS:  No abnormalities identified.     BOWEL:  There are scattered diverticula.     VESSELS:  No abnormalities identified.  Abdominal aorta is normal in caliber.      PERITONEUM/RETROPERITONEUM/LYMPH NODES:  No free fluid.  No  pneumoperitoneum.  No lymphadenopathy.     ABDOMINAL WALL:  No abnormalities identified.  SOFT TISSUES:   No abnormalities identified.     BONES:  No acute fracture or aggressive osseous lesion.  IMPRESSION:  The appendix is not identified but there is no evidence of  appendicitis.  Scattered diverticula.  No acute process.  Signed by Teja Pagan MD    Laparoscopy 6 months ago was normal,     Franklyn Gould MD  Physician  Obstetrics     Op Note     Signed     Date of Service: 2024 11:12 AM  Case Time: Procedures: Surgeons:  2024 11:12 AM   EXPLORATION LAPAROSCOPY     Franklyn Gould MD           Signed       Date: 2024                        OR Location: [facility] OR     Name: Sammi Miranda, : 1975, Age: 48 y.o., MRN: [id number], Sex: female     Diagnosis    Pre-op Diagnosis      * Pain in female pelvis [R10.2] Post-op Diagnosis     * Pain in female pelvis [R10.2]     Procedures  EXPLORATION LAPAROSCOPY  71215 - IL LAPS ABD PRTM&OMENTUM DX W/WO SPEC BR/WA SPX     Under general anesthetic lithotomy position patient prepped draped usual manner.  In-N-Out catheterization done of the bladder.  Marcaine used for skin incisions.  Through an infraumbilical incision Veress needle inserted peritoneal cavity insufflated.  5 mm trocar inserted laparoscope used to visualize the pelvis.  Additional 5 mm left lower quadrant port placed.  Findings were irregularly enlarged uterus with a fundal fibroid coming off the left side of the fundus.  Tubes and ovaries normal cul-de-sacs normal area of her prior appendectomy was normal there is no significant adhesions endometriosis or acute pathology other than her fibroid uterus.     Female F exsufflated instruments removed.  Skin incisions reapproximated.  Transferred to recovery room in stable condition  Surgeons      * Franklyn Gould - Primary     Resident/Fellow/Other Assistant:  Surgeons and Role:  * No surgeons found with a matching role *     Procedure Summary  Anesthesia:  General               ASA: II  Anesthesia Staff: Anesthesiologist: Yolie Appiah DO  CRNA: SIDDHARTHA Roper-CRNA  Estimated Blood Loss: 5mL  Intra-op Medications:     Administrations occurring from 1045 to 1145 on 08/01/24:  Medication Name Total Dose  bupivacaine PF (Marcaine) 0.5 % (5 mg/mL) injection 1 mL                   Anesthesia Record                    Intraprocedure I/O Totals          None               Specimen: No specimens collected      Staff:   Scrub Person: Eugene  Circulator: Radha  Circulator: Jolene           Procedure Details:  The patient was seen in the preoperative area. The site of surgery was properly noted/marked if necessary per policy. The patient has been actively warmed in preoperative area. Preoperative antibiotics are not indicated. Venous thrombosis prophylaxis have been ordered including bilateral sequential compression devices     Findings: Fibroid uterus     Complications:  None; patient tolerated the procedure well.     Disposition: PACU - hemodynamically stable.  Condition: stable        Electronically signed by Franklyn Gould MD at 8/1/2024 11:25 AM      Abdominal Pain  This is a chronic problem. The current episode started more than 1 year ago. The onset quality is gradual. The problem occurs every several days. The problem has been unchanged. The pain is located in the right flank. The pain is at a severity of 10/10. The quality of the pain is aching, cramping and sharp. Associated symptoms include arthralgias and myalgias. Pertinent negatives include no anorexia, belching, constipation, diarrhea, dysuria, fever, flatus, frequency, headaches, hematochezia, hematuria, melena, nausea, vomiting or weight loss. Nothing aggravates the pain. The pain is relieved by Nothing. Prior diagnostic workup includes ultrasound.       Review of Systems   Constitutional:  Negative for fever and weight loss.   Gastrointestinal:  Positive for abdominal pain. Negative for anorexia,  constipation, diarrhea, flatus, hematochezia, melena, nausea and vomiting.   Genitourinary:  Negative for dysuria, frequency and hematuria.   Musculoskeletal:  Positive for arthralgias and myalgias.   Neurological:  Negative for headaches.   All other systems reviewed and are negative.      Objective   Physical Exam  Constitutional:       Appearance: Normal appearance. She is normal weight.   Chest:   Breasts:     Right: Normal.      Left: Normal.   Genitourinary:     General: Normal vulva.      Vagina: Normal.      Cervix: Normal.      Uterus: Normal.       Adnexa: Right adnexa normal and left adnexa normal.   Neurological:      Mental Status: She is alert.         Assessment/Plan   Well woman exam.  Mammogram.  Pap 2026.  Ongoing pelvic pain.  Ultrasound CT negative. Recommend gastroenterology.         Franklyn Gould MD 05/02/25 9:47 AM

## (undated) DEVICE — TUBE SET, PNEUMOCLEAR, SMOKE EVACU, HIGH-FLOW

## (undated) DEVICE — ADHESIVE, SKIN, DERMABOND ADVANCED, 15CM, PEN-STYLE

## (undated) DEVICE — SUTURE, VICRYL, 4-0, 18 IN, PS2, UNDYED

## (undated) DEVICE — COUNTER, NEEDLE, FOAM STRIP, DOUBLE, W/BLADEGUARD, 30 COUNT

## (undated) DEVICE — Device

## (undated) DEVICE — TOWEL PACK, STERILE, 4/PACK, BLUE

## (undated) DEVICE — SUTURE, VICRYL, 0, 27 IN, UR-6, VIOLET

## (undated) DEVICE — CARE KIT, LAPAROSCOPIC, ADVANCED

## (undated) DEVICE — COVER HANDLE LIGHT, STERIS, BLUE, STERILE

## (undated) DEVICE — SCISSOR, MINI ENDO CUT, TIPS, DISP

## (undated) DEVICE — NEEDLE, INSUFFLATION, 13GAX120MM, DISP

## (undated) DEVICE — ELECTRODE BALL, 5MM

## (undated) DEVICE — CAUTERY, PENCIL, PUSH BUTTON, SMOKE EVAC, 70MM